# Patient Record
Sex: FEMALE | Race: WHITE | Employment: FULL TIME | URBAN - METROPOLITAN AREA
[De-identification: names, ages, dates, MRNs, and addresses within clinical notes are randomized per-mention and may not be internally consistent; named-entity substitution may affect disease eponyms.]

---

## 2017-03-17 ENCOUNTER — HOSPITAL ENCOUNTER (OUTPATIENT)
Dept: MAMMOGRAPHY | Age: 57
Discharge: HOME | End: 2017-03-17
Attending: FAMILY MEDICINE
Payer: COMMERCIAL

## 2017-03-17 DIAGNOSIS — Z12.39 SCREENING FOR BREAST CANCER: ICD-10-CM

## 2017-03-17 PROCEDURE — 77067 SCR MAMMO BI INCL CAD: CPT

## 2018-03-19 ENCOUNTER — HOSPITAL ENCOUNTER (OUTPATIENT)
Dept: VASCULAR SURGERY | Age: 58
Discharge: HOME | End: 2018-03-19
Attending: FAMILY MEDICINE
Payer: COMMERCIAL

## 2018-03-19 DIAGNOSIS — I73.9 PERIPHERAL VASCULAR DISEASE (HCC): ICD-10-CM

## 2018-03-19 PROCEDURE — 93880 EXTRACRANIAL BILAT STUDY: CPT

## 2018-03-19 NOTE — PROCEDURES
[facility]  *** FINAL REPORT ***    Name: Mart Rider  MRN: [id number]    Outpatient  : 20 Oct 1960  HIS Order #: [id number]  [address] [facility] Visit #: [id number]  Date: 19 Mar 2018    TYPE OF TEST: Cerebrovascular Duplex    REASON FOR TEST  PVD    Right Carotid:-             Proximal               Mid                 Distal  cm/s  Systolic  Diastolic  Systolic  Diastolic  Systolic  Diastolic  CCA:     98.8      25.0                            57.0      20.0  Bulb:  ECA:    132.0      19.0  ICA:     69.0      21.0                            57.0      22.0  ICA/CCA:  1.2       1.1    ICA Stenosis:    Right Vertebral:-  Finding: Antegrade  Sys:       50.0  Ale:       15.0    Right Subclavian:    Left Carotid:-            Proximal                Mid                 Distal  cm/s  Systolic  Diastolic  Systolic  Diastolic  Systolic  Diastolic  CCA:    711.3      32.0                            80.0      25.0  Bulb:  ECA:    127.0      20.0  ICA:     68.0      29.0                            77.0      38.0  ICA/CCA:  0.9       1.2    ICA Stenosis:    Left Vertebral:-  Finding: Antegrade  Sys:       48.0  Ale:       14.0    Left Subclavian:    INTERPRETATION/FINDINGS  PROCEDURE:  Color duplex ultrasound imaging of extracranial  cerebrovascular arteries. FINDINGS:       Right:  Internal carotid velocity is within normal limits. There   is narrowing of the internal carotid flow channel on color Doppler  imaging and  non-calcific  plaque on B-mode imaging, consistent with  less than 50 percent stenosis (lower portion of the 0 to 49 percent  range). The common and external carotid arteries are patent and  without evidence of hemodynamically significant stenosis. Left:  Internal carotid velocity is within normal limits.   There  is narrowing of the internal carotid flow channel on color Doppler  imaging and  non-calcific  plaque on B-mode imaging, consistent with  less than 50 percent stenosis (lower portion of the 0 to 49 percent  range). The common and external carotid arteries are patent and  without evidence of hemodynamically significant stenosis. IMPRESSION:  Consistent with less than  50% stenosis of the right  internal carotid and less than 50% stenosis of the left internal  carotid. Vertebrals are patent with antegrade flow. ADDITIONAL COMMENTS    I have personally reviewed the data relevant to the interpretation of  this  study. TECHNOLOGIST: Emiliano Tubbs  Signed: 03/19/2018 11:32 AM    PHYSICIAN: Johanne Horn MD  Signed: 03/20/2018 12:51 PM

## 2018-12-28 ENCOUNTER — HOSPITAL ENCOUNTER (OUTPATIENT)
Dept: MAMMOGRAPHY | Age: 58
Discharge: HOME | End: 2018-12-28
Attending: FAMILY MEDICINE
Payer: COMMERCIAL

## 2018-12-28 ENCOUNTER — HOSPITAL ENCOUNTER (OUTPATIENT)
Dept: CT IMAGING | Age: 58
Discharge: HOME | End: 2018-12-28
Attending: FAMILY MEDICINE
Payer: COMMERCIAL

## 2018-12-28 DIAGNOSIS — Z12.2 ENCOUNTER FOR SCREENING FOR MALIGNANT NEOPLASM OF RESPIRATORY ORGANS: ICD-10-CM

## 2018-12-28 DIAGNOSIS — Z12.39 ENCOUNTER FOR BREAST CANCER SCREENING OTHER THAN MAMMOGRAM: ICD-10-CM

## 2018-12-28 DIAGNOSIS — Z87.891 PERSONAL HISTORY OF NICOTINE DEPENDENCE: ICD-10-CM

## 2018-12-28 PROCEDURE — 77067 SCR MAMMO BI INCL CAD: CPT

## 2018-12-28 PROCEDURE — G0297 LDCT FOR LUNG CA SCREEN: HCPCS

## 2019-01-11 ENCOUNTER — HOSPITAL ENCOUNTER (OUTPATIENT)
Dept: MAMMOGRAPHY | Age: 59
Discharge: HOME | End: 2019-01-11
Attending: FAMILY MEDICINE
Payer: COMMERCIAL

## 2019-01-11 DIAGNOSIS — R92.8 ABNORMAL MAMMOGRAM: ICD-10-CM

## 2019-01-11 PROCEDURE — 76642 ULTRASOUND BREAST LIMITED: CPT

## 2019-01-11 PROCEDURE — 77061 BREAST TOMOSYNTHESIS UNI: CPT

## 2019-01-18 ENCOUNTER — HOSPITAL ENCOUNTER (OUTPATIENT)
Dept: MAMMOGRAPHY | Age: 59
Discharge: HOME | End: 2019-01-18
Attending: FAMILY MEDICINE
Payer: COMMERCIAL

## 2019-01-18 DIAGNOSIS — R92.8 ABNORMAL MAMMOGRAM: ICD-10-CM

## 2019-01-18 DIAGNOSIS — N63.0 LUMP OR MASS IN BREAST: ICD-10-CM

## 2019-01-18 PROCEDURE — 74011000250 HC RX REV CODE- 250: Performed by: RADIOLOGY

## 2019-01-18 PROCEDURE — 77065 DX MAMMO INCL CAD UNI: CPT

## 2019-01-18 PROCEDURE — 88342 IMHCHEM/IMCYTCHM 1ST ANTB: CPT

## 2019-01-18 PROCEDURE — 74011250636 HC RX REV CODE- 250/636: Performed by: RADIOLOGY

## 2019-01-18 PROCEDURE — 88341 IMHCHEM/IMCYTCHM EA ADD ANTB: CPT

## 2019-01-18 PROCEDURE — 19082 BX BREAST ADD LESION STRTCTC: CPT

## 2019-01-18 PROCEDURE — 19081 BX BREAST 1ST LESION STRTCTC: CPT

## 2019-01-18 PROCEDURE — 88305 TISSUE EXAM BY PATHOLOGIST: CPT

## 2019-01-18 PROCEDURE — 88360 TUMOR IMMUNOHISTOCHEM/MANUAL: CPT

## 2019-01-18 RX ORDER — LIDOCAINE HYDROCHLORIDE 10 MG/ML
15 INJECTION INFILTRATION; PERINEURAL
Status: COMPLETED | OUTPATIENT
Start: 2019-01-18 | End: 2019-01-18

## 2019-01-18 RX ORDER — LIDOCAINE HYDROCHLORIDE AND EPINEPHRINE 10; 10 MG/ML; UG/ML
10 INJECTION, SOLUTION INFILTRATION; PERINEURAL ONCE
Status: COMPLETED | OUTPATIENT
Start: 2019-01-18 | End: 2019-01-18

## 2019-01-18 RX ADMIN — LIDOCAINE HYDROCHLORIDE 15 ML: 10 INJECTION, SOLUTION INFILTRATION; PERINEURAL at 10:00

## 2019-01-18 RX ADMIN — LIDOCAINE HYDROCHLORIDE,EPINEPHRINE BITARTRATE 100 MG: 10; .01 INJECTION, SOLUTION INFILTRATION; PERINEURAL at 10:00

## 2019-01-18 NOTE — DISCHARGE INSTRUCTIONS
Breast Biopsy Discharge Instructions    PAIN CONTROL     You may have mild discomfort; take 1-2 Tylenol every 4-6 hours as needed.  Do not take aspirin containing products or anti-inflammatory medications (Advil, Aleve, Motrin, Ibuprofen, etc.) for 24 hours.  Wearing a comfortable bra for support may help with discomfort. WOUND CARE      A small amount of bleeding or bruising at the biopsy site is normal. Watch for signs and symptoms of infections: skin warm to touch, yellowish drainage from wound, fever or severe pain.  Place an ice pack over the site hourly, 20-30 at a time for a few hours today.  The clear dressing is water resistant (you may shower, but do not allow the dressing to become saturated). You may remove the dressing in 48 hours. The steri-strips (small pieces of tape covering the incision) will fall off on their own in a few days. If the clear dressing irritates your skin or begins to peel off, you may remove it. Remember, if you remove the clear dressing before 48 hours, you should not get the site wet.  If at any point you have EXCESSIVE BLEEDING (saturating the gauze under the clear dressing) OR pain please call:    Daytime 7:30am-5:00pm: [facility] [phone]    After Hours:    [phone] (ask to speak to a radiologist)              or [address] [place] [address] [phone]    ACTIVITY     Do not participate in any strenuous activities for 24 hours (exercise, sports, housework, etc.).  You may resume work (light duty only) for the first 24 hours.  No heavy lifting with the arm on the affected side of your body. ADDITIONAL INSTRUCTIONS    We will call you with results on Tuesday January 22 in the afternoon.       [address]    MD: Dinora  [facility]  Radiology Tech: Isael Skinner

## 2019-01-18 NOTE — PROGRESS NOTES
Patient tolerated right breast biopsy x 2 well with minimal bleeding. Biopsy site was bandaged in the usual fashion and discharge instructions were reviewed with the patient. She was provided with a written copy as well. Advised patient that results should be available by Tuesday and that she will receive a phone call in the afternoon. Encouraged her to call with any questions or concerns.

## 2019-01-22 NOTE — PROGRESS NOTES
Pathology results conveyed to patient by Dr. Karlene Daniels via phone call. Called patient with appointment information, Dr. Wenceslao Abernathy Friday 7/85 at 1 pm, 12:30 arrival time. Patient provided with office contact information. She states that the biopsy sites are healing well and she has no concerns. Encouraged her to call with any questions.

## 2019-01-25 ENCOUNTER — OFFICE VISIT (OUTPATIENT)
Dept: SURGERY | Age: 59
End: 2019-01-25

## 2019-01-25 VITALS — BODY MASS INDEX: 33.23 KG/M2 | WEIGHT: 176 LBS | HEIGHT: 61 IN

## 2019-01-25 DIAGNOSIS — C50.911 INVASIVE DUCTAL CARCINOMA OF BREAST, FEMALE, RIGHT (HCC): Primary | ICD-10-CM

## 2019-01-25 DIAGNOSIS — N63.10 BREAST MASS, RIGHT: ICD-10-CM

## 2019-01-25 RX ORDER — DEXTROAMPHETAMINE SACCHARATE, AMPHETAMINE ASPARTATE, DEXTROAMPHETAMINE SULFATE AND AMPHETAMINE SULFATE 5; 5; 5; 5 MG/1; MG/1; MG/1; MG/1
20 TABLET ORAL 2 TIMES DAILY
COMMUNITY
End: 2021-02-08

## 2019-01-25 RX ORDER — AMLODIPINE BESYLATE 5 MG/1
TABLET ORAL
Refills: 6 | COMMUNITY
Start: 2019-01-19 | End: 2021-10-08

## 2019-01-25 RX ORDER — ASPIRIN 81 MG/1
81 TABLET ORAL DAILY
COMMUNITY
End: 2021-11-12

## 2019-01-25 RX ORDER — LISINOPRIL AND HYDROCHLOROTHIAZIDE 12.5; 2 MG/1; MG/1
TABLET ORAL
Refills: 3 | COMMUNITY
Start: 2019-01-19 | End: 2021-10-08

## 2019-01-25 RX ORDER — ATORVASTATIN CALCIUM 20 MG/1
TABLET, FILM COATED ORAL
Refills: 2 | COMMUNITY
Start: 2019-01-19

## 2019-01-25 NOTE — PROGRESS NOTES
HISTORY OF PRESENT ILLNESS [facility] is a 62 y.o. female. HPI    NEW patient presents for consultation at the request of Dr. Destiny Terrell for new diagnosis of RIGHT breast cancer diagnosed after an abnormal screening mammogram.  She is not feeling any breast lumps, has no breast pain, no nipple discharge/retractin or skin change. She has had multiple biopsies in the past, all benign. 01/18/19: RIGHT breast biopsies. PATH as follows: - Site A, mass: IDC with tubular features, 0.4 cm in greatest dimension in a single core , grade 1, ER+(100%)/[place]+(95%)/HER2-. 
 - Site B, calcifications: Fibrocystic changes with usual ductal hyperplasia, columnar cell hyperplasia, apocrine cysts and calcifications, negative for in situ or invasive carcinoma. There is no FH of breast or ovarian cancer. BILATERAL screening mammogram 12/28/18, BIRADS 0, incomplete. RIGHT diagnostic mammogram and RIGHT breast ultrsaound 1/11/19, suspicious. Review of Systems Constitutional: Negative. HENT: Negative. Eyes: Negative. Respiratory: Negative. Cardiovascular: Negative. Gastrointestinal: Positive for heartburn. Genitourinary: Negative. Musculoskeletal: Negative. Skin: Negative. Neurological: Negative. Endo/Heme/Allergies: Negative. Psychiatric/Behavioral: Positive for depression. The patient is nervous/anxious. Physical Exam 
 
ASSESSMENT and PLAN 
{ASSESSMENT/PLAN:[id number]}

## 2019-01-25 NOTE — PROGRESS NOTES
HISTORY OF PRESENT ILLNESS  Espiridion Severe is a 62 y.o. female. HPI  NEW patient presents for consultation at the request of Dr. Turpin Stands for new diagnosis of RIGHT breast cancer diagnosed after an abnormal screening mammogram.  She is not feeling any breast lumps, has no breast pain, no nipple discharge/retractin or skin change. She has had multiple biopsies in the past, all benign. 01/18/19: RIGHT breast biopsies. PATH as follows:              - Site A, mass: IDC with tubular features, 0.4 cm in greatest dimension in a single core , grade 1, ER+(100%)/[place]+(95%)/HER2-.              - Site B, calcifications: Fibrocystic changes with usual ductal hyperplasia, columnar cell hyperplasia, apocrine cysts and calcifications, negative for in situ or invasive carcinoma. There is no FH of breast or ovarian cancer. BILATERAL screening mammogram 12/28/18, BIRADS 0, incomplete.     RIGHT diagnostic mammogram and RIGHT breast ultrsaound 1/11/19, suspicious.       Past Medical History:   Diagnosis Date    Hypertension     Mixed connective tissue disease ([facility] [address] 2017    Sees Dr. Jaquelin Diallo    Traumatic neuroma, left neck 9/14/2010       Past Surgical History:   Procedure Laterality Date    HX BREAST BIOPSY Bilateral     benign stereo biopsies years ago    HX CHOLECYSTECTOMY      HX TUBAL LIGATION      [place] REMOVAL OF ANAL FISSURE      PREP FACE/ORAL PROST PALATAL LIFT         Social History     Socioeconomic History    Marital status:      Spouse name: Not on file    Number of children: Not on file    Years of education: Not on file    Highest education level: Not on file   Social Needs    Financial resource strain: Not on file    Food insecurity - worry: Not on file    Food insecurity - inability: Not on file   [employer] needs - medical: Not on file   [employer] needs - non-medical: Not on file   Occupational History    Not on file   Tobacco Use    Smoking status: Current Every Day Smoker Packs/day: 1.00     Years: 20.00     Pack years: 20.00    Smokeless tobacco: Never Used   Substance and Sexual Activity    Alcohol use: Yes    Drug use: No    Sexual activity: Yes   Other Topics Concern    Not on file   Social History Narrative    Not on file       Current Outpatient Medications on File Prior to Visit   Medication Sig Dispense Refill    amLODIPine (NORVASC) 5 mg tablet TK 1 T PO QD  6    atorvastatin (LIPITOR) 20 mg tablet TK 1 T PO QD  2    lisinopril-hydroCHLOROthiazide (PRINZIDE, ZESTORETIC) 20-12.5 mg per tablet TK 1 T PO QD  3    aspirin delayed-release 81 mg tablet Take  by mouth daily.  dextroamphetamine-amphetamine (ADDERALL) 20 mg tablet Take 20 mg by mouth.  escitalopram (LEXAPRO) 10 mg tablet Take 20 mg by mouth daily.  omeprazole (PRILOSEC OTC) 20 mg tablet Take 20 mg by mouth daily. No current facility-administered medications on file prior to visit. No Known Allergies    OB History     No data available        ROS  Constitutional: Negative. HENT: Negative. Eyes: Negative. Respiratory: Negative. Cardiovascular: Negative. Gastrointestinal: Positive for heartburn. Genitourinary: Negative. Musculoskeletal: Negative. Skin: Negative. Neurological: Negative. Endo/Heme/Allergies: Negative. Psychiatric/Behavioral: Positive for depression. The patient is nervous/anxious. Physical Exam   Cardiovascular: Normal rate, regular rhythm and normal heart sounds. Pulmonary/Chest: Breath sounds normal. Right breast exhibits no inverted nipple, no mass, no nipple discharge, no skin change and no tenderness. Left breast exhibits no inverted nipple, no mass, no nipple discharge, no skin change and no tenderness. Breasts are symmetrical.       Lymphadenopathy:        Right cervical: No superficial cervical, no deep cervical and no posterior cervical adenopathy present.        Left cervical: No superficial cervical, no deep cervical and no posterior cervical adenopathy present. She has no axillary adenopathy. Right axillary: No pectoral and no lateral adenopathy present. Left axillary: No pectoral and no lateral adenopathy present. BREAST ULTRASOUND, Preop planning  Indication:preop planning  right Breast 7-8 o'clock   Technique: The area was scanned using a high-frequency linear-array near-field transducer  Findings: Mass and bx clip  Impression: Breast cancer  Disposition:  Surgery    ASSESSMENT and PLAN    ICD-10-CM ICD-9-CM    1. Invasive ductal carcinoma of breast, female, right ([facility] [address] C50.911 174.9    2. Breast mass, right N63.10 611.72       Pt presents for consultation re: new diagnosis of RIGHT breast IDC, ER+/AR+/HER2-, clinical stage 1. Dense breasts on exam. US visualizes mass and bx clip at RIGHT breast 7-8:00. We had a long discussion of options for treatment. A total of 85 minutes were spent face-to-face with the patient during this encounter, accompanied by her  and daughter, and over half of that time was spent on counseling and coordination of care. We discussed in depth the pathology results, and the need for surgery following MRI for extent of disease and surgical planning. The goals of treatment are to treat the breast, and to reduce risk of recurrence of this cancer in the breast or elsewhere in the body. Discussed treatment options with risks, complications, benefits, and limitations, including lumpectomy with XRT, and mastectomy, both having the same cure rate. Also discussed the placement of BioZorb during a lumpectomy, which functions as both a target for any adjuvant XRT, and as a \"scaffolding\" for breast tissue. The Marzette Beer will dissolve in approximately 1.5 years, and leave markers behind. Also discussed benefit and technique of SLNBx of 3-4 LNs during initial surgery.     We also covered risk reduction strategies as well as post-surgical therapies including XRT, chemotherapy, and hormonal therapy with estrogen blocker. If chemo is not needed, XRT will start 3-4 weeks after surgery. If chemo is necessary, XRT will follow chemo. XRT treatments after a lumpectomy will be 5 days/week, and will last for 4-6 weeks, depending on LN involvement. Pt would not likely need XRT after a mastectomy, except in the case of LN involvement. Will send for MammaPrint to help determine whether pt will benefit from chemotherapy. This will also be determined in part by final tumor size and LN involvement. Pt will not likely need chemo for small, clinically low risk tumor, but if MammaPrint indicates high risk tumor, it will be important for her to stay on estrogen blocker. Discussed that estrogen blocking pill will further reduce the risk of recurrence, as pts cancer is ER+. Side effects of estrogen blocker may include hot flashes. Side effects of XRT may include fatigue, local skin changes, and joint pain. We also discussed the pts questions and concerns. Lumpectomy is an outpatient procedure, with stitches below the skin, and waterproof glue over the incision. Will plan for circumareolar incision at LOQ of areola. Pt may be able to manage post-op pain with Tylenol. Expected 1-2 week recovery period after lumpectomy. Pt notes that she works from home - she can return working as soon as she feels well. Pt has elected to proceed with lumpectomy. Will order MRI and plan further from there - radiology will call pt to schedule. This plan was reviewed with the patient and patient agrees. All questions were answered.     Written by Massiel Conti, as dictated by Dr. Rosa Pavon MD.

## 2019-01-25 NOTE — LETTER
1/28/2019 9:40 AM 
 
Patient: Markel Shrestha YOB: 1960 Date of Visit: 1/25/2019 Dear Dr. Natalio Thornton: Thank you for referring Ms. Markel Shrestha to me for evaluation/treatment. Below are the relevant portions of my assessment and plan of care. HISTORY OF PRESENT ILLNESS Markel Shrestha is a 62 y.o. female. HPI 
NEW patient presents for consultation at the request of Dr. Temitope Schmidt for new diagnosis of RIGHT breast cancer diagnosed after an abnormal screening mammogram.  She is not feeling any breast lumps, has no breast pain, no nipple discharge/retractin or skin change. She has had multiple biopsies in the past, all benign. 01/18/19: RIGHT breast biopsies. PATH as follows: - Site A, mass: IDC with tubular features, 0.4 cm in greatest dimension in a single core , grade 1, ER+(100%)/[place]+(95%)/HER2-. 
            - Site B, calcifications: Fibrocystic changes with usual ductal hyperplasia, columnar cell hyperplasia, apocrine cysts and calcifications, negative for in situ or invasive carcinoma. There is no FH of breast or ovarian cancer. BILATERAL screening mammogram 12/28/18, BIRADS 0, incomplete. RIGHT diagnostic mammogram and RIGHT breast ultrsaound 1/11/19, suspicious.   
  
Past Medical History:  
Diagnosis Date  Hypertension  Mixed connective tissue disease ([facility] [address] 2017 Sees Dr. Kaylyn Smith  Traumatic neuroma, left neck 9/14/2010 Past Surgical History:  
Procedure Laterality Date  HX BREAST BIOPSY Bilateral   
 benign stereo biopsies years ago  HX CHOLECYSTECTOMY  HX TUBAL LIGATION    
 [place]AL OF [place]SURE    
 PREP FACE/ORAL PROST PALATAL LIFT Social History Socioeconomic History  Marital status:  Spouse name: Not on file  Number of children: Not on file  Years of education: Not on file  Highest education level: Not on file Social Needs  Financial resource strain: Not on file  Food insecurity - worry: Not on file  Food insecurity - inability: Not on file  Transportation needs - medical: Not on file  Transportation needs - non-medical: Not on file Occupational History  Not on file Tobacco Use  Smoking status: Current Every Day Smoker Packs/day: 1.00 Years: 20.00 Pack years: 20.00  Smokeless tobacco: Never Used Substance and Sexual Activity  Alcohol use: Yes  Drug use: No  
 Sexual activity: Yes Other Topics Concern  Not on file Social History Narrative  Not on file Current Outpatient Medications on File Prior to Visit Medication Sig Dispense Refill  amLODIPine (NORVASC) 5 mg tablet TK 1 T PO QD  6  
 atorvastatin (LIPITOR) 20 mg tablet TK 1 T PO QD  2  
 lisinopril-hydroCHLOROthiazide (PRINZIDE, ZESTORETIC) 20-12.5 mg per tablet TK 1 T PO QD  3  
 aspirin delayed-release 81 mg tablet Take  by mouth daily.  dextroamphetamine-amphetamine (ADDERALL) 20 mg tablet Take 20 mg by mouth.  escitalopram (LEXAPRO) 10 mg tablet Take 20 mg by mouth daily.  omeprazole (PRILOSEC OTC) 20 mg tablet Take 20 mg by mouth daily. No current facility-administered medications on file prior to visit. No Known Allergies OB History No data available ROS Constitutional: Negative. HENT: Negative. Eyes: Negative. Respiratory: Negative. Cardiovascular: Negative. Gastrointestinal: Positive for heartburn. Genitourinary: Negative. Musculoskeletal: Negative. Skin: Negative. Neurological: Negative. Endo/Heme/Allergies: Negative. Psychiatric/Behavioral: Positive for depression. The patient is nervous/anxious. Physical Exam  
Cardiovascular: Normal rate, regular rhythm and normal heart sounds. Pulmonary/Chest: Breath sounds normal. Right breast exhibits no inverted nipple, no mass, no nipple discharge, no skin change and no tenderness. Left breast exhibits no inverted nipple, no mass, no nipple discharge, no skin change and no tenderness. Breasts are symmetrical.  
 
 
Lymphadenopathy:  
     Right cervical: No superficial cervical, no deep cervical and no posterior cervical adenopathy present. Left cervical: No superficial cervical, no deep cervical and no posterior cervical adenopathy present. She has no axillary adenopathy. Right axillary: No pectoral and no lateral adenopathy present. Left axillary: No pectoral and no lateral adenopathy present. BREAST ULTRASOUND, Preop planning Indication:preop planning  right Breast 7-8 o'clock Technique: The area was scanned using a high-frequency linear-array near-field transducer Findings: Mass and bx clip Impression: Breast cancer Disposition:  Surgery ASSESSMENT and PLAN 
  ICD-10-CM ICD-9-CM 1. Invasive ductal carcinoma of breast, female, right ([facility] [address] C50.911 174.9 2. Breast mass, right N63.10 611.72 Pt presents for consultation re: new diagnosis of RIGHT breast IDC, ER+/[place]+/HER2-, clinical stage 1. Dense breasts on exam. US visualizes mass and bx clip at RIGHT breast 7-8:00. We had a long discussion of options for treatment. A total of 85 minutes were spent face-to-face with the patient during this encounter, accompanied by her  and daughter, and over half of that time was spent on counseling and coordination of care. We discussed in depth the pathology results, and the need for surgery following MRI for extent of disease and surgical planning. The goals of treatment are to treat the breast, and to reduce risk of recurrence of this cancer in the breast or elsewhere in the body. Discussed treatment options with risks, complications, benefits, and limitations, including lumpectomy with XRT, and mastectomy, both having the same cure rate.  Also discussed the placement of BioZorb during a lumpectomy, which functions as both a target for any adjuvant XRT, and as a \"scaffolding\" for breast tissue. The Radene Presley will dissolve in approximately 1.5 years, and leave markers behind. Also discussed benefit and technique of SLNBx of 3-4 LNs during initial surgery. We also covered risk reduction strategies as well as post-surgical therapies including XRT, chemotherapy, and hormonal therapy with estrogen blocker. If chemo is not needed, XRT will start 3-4 weeks after surgery. If chemo is necessary, XRT will follow chemo. XRT treatments after a lumpectomy will be 5 days/week, and will last for 4-6 weeks, depending on LN involvement. Pt would not likely need XRT after a mastectomy, except in the case of LN involvement. Will send for MammaPrint to help determine whether pt will benefit from chemotherapy. This will also be determined in part by final tumor size and LN involvement. Pt will not likely need chemo for small, clinically low risk tumor, but if MammaPrint indicates high risk tumor, it will be important for her to stay on estrogen blocker. Discussed that estrogen blocking pill will further reduce the risk of recurrence, as pts cancer is ER+. Side effects of estrogen blocker may include hot flashes. Side effects of XRT may include fatigue, local skin changes, and joint pain. We also discussed the pts questions and concerns. Lumpectomy is an outpatient procedure, with stitches below the skin, and waterproof glue over the incision. Will plan for circumareolar incision at LOQ of areola. Pt may be able to manage post-op pain with Tylenol. Expected 1-2 week recovery period after lumpectomy. Pt notes that she works from home - she can return working as soon as she feels well. Pt has elected to proceed with lumpectomy. Will order MRI and plan further from there - radiology will call pt to schedule. This plan was reviewed with the patient and patient agrees. All questions were answered. Written by Hero Jamil, as dictated by Dr. Shira Anne MD. 
 
 
If you have questions, please do not hesitate to call me. I look forward to following Ms. Sid Durant along with you.  
 
 
 
Sincerely, 
 
 
Bjorn Carrion MD

## 2019-01-28 NOTE — COMMUNICATION BODY
HISTORY OF PRESENT ILLNESS  Reyes Fontana is a 62 y.o. female. HPI  NEW patient presents for consultation at the request of Dr. Flynn Castillo for new diagnosis of RIGHT breast cancer diagnosed after an abnormal screening mammogram.  She is not feeling any breast lumps, has no breast pain, no nipple discharge/retractin or skin change. She has had multiple biopsies in the past, all benign. 01/18/19: RIGHT breast biopsies. PATH as follows:              - Site A, mass: IDC with tubular features, 0.4 cm in greatest dimension in a single core , grade 1, ER+(100%)/AL+(95%)/HER2-.              - Site B, calcifications: Fibrocystic changes with usual ductal hyperplasia, columnar cell hyperplasia, apocrine cysts and calcifications, negative for in situ or invasive carcinoma. There is no FH of breast or ovarian cancer. BILATERAL screening mammogram 12/28/18, BIRADS 0, incomplete.     RIGHT diagnostic mammogram and RIGHT breast ultrsaound 1/11/19, suspicious.       Past Medical History:   Diagnosis Date    Hypertension     Mixed connective tissue disease ([facility] [address] 2017    Sees Dr. Juan Francisco Dillard    Traumatic neuroma, left neck 9/14/2010       Past Surgical History:   Procedure Laterality Date    HX BREAST BIOPSY Bilateral     benign stereo biopsies years ago    HX CHOLECYSTECTOMY      HX TUBAL LIGATION      AL REMOVAL OF ANAL FISSURE      PREP FACE/ORAL PROST PALATAL LIFT         Social History     Socioeconomic History    Marital status:      Spouse name: Not on file    Number of children: Not on file    Years of education: Not on file    Highest education level: Not on file   Social Needs    Financial resource strain: Not on file    Food insecurity - worry: Not on file    Food insecurity - inability: Not on file   [employer] needs - medical: Not on file   [employer] needs - non-medical: Not on file   Occupational History    Not on file   Tobacco Use    Smoking status: Current Every Day Smoker Packs/day: 1.00     Years: 20.00     Pack years: 20.00    Smokeless tobacco: Never Used   Substance and Sexual Activity    Alcohol use: Yes    Drug use: No    Sexual activity: Yes   Other Topics Concern    Not on file   Social History Narrative    Not on file       Current Outpatient Medications on File Prior to Visit   Medication Sig Dispense Refill    amLODIPine (NORVASC) 5 mg tablet TK 1 T PO QD  6    atorvastatin (LIPITOR) 20 mg tablet TK 1 T PO QD  2    lisinopril-hydroCHLOROthiazide (PRINZIDE, ZESTORETIC) 20-12.5 mg per tablet TK 1 T PO QD  3    aspirin delayed-release 81 mg tablet Take  by mouth daily.  dextroamphetamine-amphetamine (ADDERALL) 20 mg tablet Take 20 mg by mouth.  escitalopram (LEXAPRO) 10 mg tablet Take 20 mg by mouth daily.  omeprazole (PRILOSEC OTC) 20 mg tablet Take 20 mg by mouth daily. No current facility-administered medications on file prior to visit. No Known Allergies    OB History     No data available        ROS  Constitutional: Negative. HENT: Negative. Eyes: Negative. Respiratory: Negative. Cardiovascular: Negative. Gastrointestinal: Positive for heartburn. Genitourinary: Negative. Musculoskeletal: Negative. Skin: Negative. Neurological: Negative. Endo/Heme/Allergies: Negative. Psychiatric/Behavioral: Positive for depression. The patient is nervous/anxious. Physical Exam   Cardiovascular: Normal rate, regular rhythm and normal heart sounds. Pulmonary/Chest: Breath sounds normal. Right breast exhibits no inverted nipple, no mass, no nipple discharge, no skin change and no tenderness. Left breast exhibits no inverted nipple, no mass, no nipple discharge, no skin change and no tenderness. Breasts are symmetrical.       Lymphadenopathy:        Right cervical: No superficial cervical, no deep cervical and no posterior cervical adenopathy present.        Left cervical: No superficial cervical, no deep cervical and no posterior cervical adenopathy present. She has no axillary adenopathy. Right axillary: No pectoral and no lateral adenopathy present. Left axillary: No pectoral and no lateral adenopathy present. BREAST ULTRASOUND, Preop planning  Indication:preop planning  right Breast 7-8 o'clock   Technique: The area was scanned using a high-frequency linear-array near-field transducer  Findings: Mass and bx clip  Impression: Breast cancer  Disposition:  Surgery    ASSESSMENT and PLAN    ICD-10-CM ICD-9-CM    1. Invasive ductal carcinoma of breast, female, right ([facility] [address] C50.911 174.9    2. Breast mass, right N63.10 611.72       Pt presents for consultation re: new diagnosis of RIGHT breast IDC, ER+/MT+/HER2-, clinical stage 1. Dense breasts on exam. US visualizes mass and bx clip at RIGHT breast 7-8:00. We had a long discussion of options for treatment. A total of 85 minutes were spent face-to-face with the patient during this encounter, accompanied by her  and daughter, and over half of that time was spent on counseling and coordination of care. We discussed in depth the pathology results, and the need for surgery following MRI for extent of disease and surgical planning. The goals of treatment are to treat the breast, and to reduce risk of recurrence of this cancer in the breast or elsewhere in the body. Discussed treatment options with risks, complications, benefits, and limitations, including lumpectomy with XRT, and mastectomy, both having the same cure rate. Also discussed the placement of BioZorb during a lumpectomy, which functions as both a target for any adjuvant XRT, and as a \"scaffolding\" for breast tissue. The [place] will dissolve in approximately 1.5 years, and leave markers behind. Also discussed benefit and technique of SLNBx of 3-4 LNs during initial surgery.     We also covered risk reduction strategies as well as post-surgical therapies including XRT, chemotherapy, and hormonal therapy with estrogen blocker. If chemo is not needed, XRT will start 3-4 weeks after surgery. If chemo is necessary, XRT will follow chemo. XRT treatments after a lumpectomy will be 5 days/week, and will last for 4-6 weeks, depending on LN involvement. Pt would not likely need XRT after a mastectomy, except in the case of LN involvement. Will send for MammaPrint to help determine whether pt will benefit from chemotherapy. This will also be determined in part by final tumor size and LN involvement. Pt will not likely need chemo for small, clinically low risk tumor, but if MammaPrint indicates high risk tumor, it will be important for her to stay on estrogen blocker. Discussed that estrogen blocking pill will further reduce the risk of recurrence, as pts cancer is ER+. Side effects of estrogen blocker may include hot flashes. Side effects of XRT may include fatigue, local skin changes, and joint pain. We also discussed the pts questions and concerns. Lumpectomy is an outpatient procedure, with stitches below the skin, and waterproof glue over the incision. Will plan for circumareolar incision at LOQ of areola. Pt may be able to manage post-op pain with Tylenol. Expected 1-2 week recovery period after lumpectomy. Pt notes that she works from home - she can return working as soon as she feels well. Pt has elected to proceed with lumpectomy. Will order MRI and plan further from there - radiology will call pt to schedule. This plan was reviewed with the patient and patient agrees. All questions were answered.     Written by Sonam Riggs, as dictated by Dr. Gosia Vila MD.

## 2019-01-29 ENCOUNTER — DOCUMENTATION ONLY (OUTPATIENT)
Dept: SURGERY | Age: 59
End: 2019-01-29

## 2019-01-30 NOTE — PROGRESS NOTES
Faxed TRF to [facility] to order MammaPrint per order of Dr. Lilliam Johnson, confirmation received. Insurance letter mailed to patient.

## 2019-02-01 ENCOUNTER — HOSPITAL ENCOUNTER (OUTPATIENT)
Dept: MRI IMAGING | Age: 59
Discharge: HOME | End: 2019-02-01
Attending: SURGERY
Payer: COMMERCIAL

## 2019-02-01 ENCOUNTER — DOCUMENTATION ONLY (OUTPATIENT)
Dept: SURGERY | Age: 59
End: 2019-02-01

## 2019-02-01 DIAGNOSIS — C50.911 INVASIVE DUCTAL CARCINOMA OF BREAST, FEMALE, RIGHT (HCC): ICD-10-CM

## 2019-02-01 LAB — CREAT BLD-MCNC: 1 MG/DL (ref 0.6–1.3)

## 2019-02-01 PROCEDURE — A9585 GADOBUTROL INJECTION: HCPCS | Performed by: SURGERY

## 2019-02-01 PROCEDURE — 77049 MRI BREAST C-+ W/CAD BI: CPT

## 2019-02-01 PROCEDURE — C8937 CAD BREAST MRI: HCPCS

## 2019-02-01 PROCEDURE — 74011250636 HC RX REV CODE- 250/636: Performed by: SURGERY

## 2019-02-01 PROCEDURE — 82565 ASSAY OF CREATININE: CPT

## 2019-02-01 RX ADMIN — GADOBUTROL 8 ML: 604.72 INJECTION INTRAVENOUS at 08:47

## 2019-02-05 ENCOUNTER — TELEPHONE (OUTPATIENT)
Dept: SURGERY | Age: 59
End: 2019-02-05

## 2019-02-06 ENCOUNTER — DOCUMENTATION ONLY (OUTPATIENT)
Dept: SURGERY | Age: 59
End: 2019-02-06

## 2019-02-06 NOTE — PROGRESS NOTES
MammaPrint returned \"unable to provide result for this specimen. \"  Will let Dr. Tucker Saravia know.

## 2019-02-07 DIAGNOSIS — C50.911 MALIGNANT NEOPLASM OF RIGHT FEMALE BREAST, UNSPECIFIED ESTROGEN RECEPTOR STATUS, UNSPECIFIED SITE OF BREAST (HCC): Primary | ICD-10-CM

## 2019-02-18 ENCOUNTER — HOSPITAL ENCOUNTER (OUTPATIENT)
Dept: PREADMISSION TESTING | Age: 59
Discharge: HOME | End: 2019-02-18
Payer: COMMERCIAL

## 2019-02-18 ENCOUNTER — HOSPITAL ENCOUNTER (OUTPATIENT)
Dept: NON INVASIVE DIAGNOSTICS | Age: 59
Discharge: HOME | End: 2019-02-18
Attending: ANESTHESIOLOGY
Payer: COMMERCIAL

## 2019-02-18 VITALS
BODY MASS INDEX: 34.07 KG/M2 | RESPIRATION RATE: 18 BRPM | HEART RATE: 79 BPM | SYSTOLIC BLOOD PRESSURE: 120 MMHG | OXYGEN SATURATION: 97 % | HEIGHT: 61 IN | WEIGHT: 180.44 LBS | DIASTOLIC BLOOD PRESSURE: 59 MMHG | TEMPERATURE: 98 F

## 2019-02-18 LAB
ANION GAP SERPL CALC-SCNC: 6 MMOL/L (ref 5–15)
ATRIAL RATE: 66 BPM
BUN SERPL-MCNC: 26 MG/DL (ref 6–20)
BUN/CREAT SERPL: 27 (ref 12–20)
CALCIUM SERPL-MCNC: 9.5 MG/DL (ref 8.5–10.1)
CALCULATED P AXIS, ECG09: 59 DEGREES
CALCULATED R AXIS, ECG10: 66 DEGREES
CALCULATED T AXIS, ECG11: 82 DEGREES
CHLORIDE SERPL-SCNC: 102 MMOL/L (ref 97–108)
CO2 SERPL-SCNC: 31 MMOL/L (ref 21–32)
CREAT SERPL-MCNC: 0.95 MG/DL (ref 0.55–1.02)
DIAGNOSIS, 93000: NORMAL
GLUCOSE SERPL-MCNC: 89 MG/DL (ref 65–100)
P-R INTERVAL, ECG05: 152 MS
POTASSIUM SERPL-SCNC: 4.8 MMOL/L (ref 3.5–5.1)
Q-T INTERVAL, ECG07: 392 MS
QRS DURATION, ECG06: 76 MS
QTC CALCULATION (BEZET), ECG08: 410 MS
SODIUM SERPL-SCNC: 139 MMOL/L (ref 136–145)
VENTRICULAR RATE, ECG03: 66 BPM

## 2019-02-18 PROCEDURE — 36415 COLL VENOUS BLD VENIPUNCTURE: CPT

## 2019-02-18 PROCEDURE — 80048 BASIC METABOLIC PNL TOTAL CA: CPT

## 2019-02-18 PROCEDURE — 93005 ELECTROCARDIOGRAM TRACING: CPT

## 2019-02-18 NOTE — PERIOP NOTES
[address] [facility] [address], [address] [facility]                            MAIN OR                                  [phone]   MAIN PRE OP                          [phone]                                                                                AMBULATORY PRE OP          [phone]  PRE-ADMISSION TESTING    [phone]     Surgery Date:   Tuesday, 2/26/19. Is surgery arrival time given by surgeon? Yes. Please follow instructions by Dr. Lewis Lapping office. INSTRUCTIONS BEFORE YOUR SURGERY   When You  Arrive   Arrive at the [address] [facility] on the day of your surgery  Have your insurance card, photo ID, and any copayment (if needed)     Food   and   Drink   NO food or drink after midnight the night before surgery    This means NO water, gum, mints, coffee, juice, etc.  No alcohol (beer, wine, liquor) 24 hours before and after surgery     Medications to   TAKE   Morning of Surgery   MEDICATIONS TO TAKE THE MORNING OF SURGERY WITH A SIP OF WATER:    Amlodipine, Omeprazole and Lexapro with small sip of water. Medications  To  STOP      7 days before surgery    Non-Steroidal anti-inflammatory Drugs (NSAID's): for example, Ibuprofen (Advil, Motrin), Naproxen (Aleve)   Aspirin, if taking for pain    Herbal supplements, vitamins, and fish oil   Other:  (Pain medications not listed above, including Tylenol may be taken)   Blood  Thinners    If you take  Aspirin, Plavix, Coumadin, or any blood-thinning or anti-blood clot medicine, talk to the doctor who prescribed the medications for pre-operative instructions.      Bathing Clothing  Jewelry  Valuables       If you shower the morning of surgery, please do not apply anything to your skin (lotions, powders, deodorant, or makeup, especially mascara)   Follow all special bath instructions (for total joint replacement, spine and bowel surgeries)   Do not shave or trim anywhere 24 hours before surgery   Wear your hair loose or down; no pony-tails, buns, or metal hair clips   Wear loose, comfortable, clean clothes   Wear glasses instead of contacts   Leave money, valuables, and jewelry, including body piercings, at home     Going Home       or Spending the Night    SAME-DAY SURGERY: You must have a responsible adult drive you home and stay with you 24 hours after surgery   ADMITS: If your doctor is keeping you into the hospital after surgery, leave personal belongings/luggage in your car until you have a hospital room number. Hospital discharge time is 12 noon  Drivers must be here before 12 noon unless you are told differently   Special Instructions Free  parking with No Tipping. Bring completed medication form on day of surgery. Follow all instructions so your surgery wont be cancelled. Please, be on time. If a situation occurs and you are delayed the day of surgery, call [phone] or          [phone]. If your physical condition changes (like a fever, cold, flu, etc.) call your surgeon. The patient was contacted  in person / . Home medication reviewed and verified during PAT appointment. The patient verbalizes understanding of all instructions and does not  need reinforcement.

## 2019-02-20 ENCOUNTER — DOCUMENTATION ONLY (OUTPATIENT)
Dept: SURGERY | Age: 59
End: 2019-02-20

## 2019-02-25 ENCOUNTER — ANESTHESIA EVENT (OUTPATIENT)
Dept: SURGERY | Age: 59
End: 2019-02-25
Payer: COMMERCIAL

## 2019-02-26 ENCOUNTER — HOSPITAL ENCOUNTER (OUTPATIENT)
Dept: NUCLEAR MEDICINE | Age: 59
Discharge: HOME | End: 2019-02-26
Attending: SURGERY
Payer: COMMERCIAL

## 2019-02-26 ENCOUNTER — HOSPITAL ENCOUNTER (OUTPATIENT)
Age: 59
Setting detail: OUTPATIENT SURGERY
Discharge: HOME | End: 2019-02-26
Attending: SURGERY | Admitting: SURGERY
Payer: COMMERCIAL

## 2019-02-26 ENCOUNTER — ANESTHESIA (OUTPATIENT)
Dept: SURGERY | Age: 59
End: 2019-02-26
Payer: COMMERCIAL

## 2019-02-26 VITALS
DIASTOLIC BLOOD PRESSURE: 63 MMHG | HEART RATE: 71 BPM | BODY MASS INDEX: 33.05 KG/M2 | TEMPERATURE: 97.4 F | HEIGHT: 61 IN | OXYGEN SATURATION: 95 % | WEIGHT: 175.04 LBS | SYSTOLIC BLOOD PRESSURE: 111 MMHG | RESPIRATION RATE: 12 BRPM

## 2019-02-26 DIAGNOSIS — C50.911 MALIGNANT NEOPLASM OF RIGHT FEMALE BREAST, UNSPECIFIED ESTROGEN RECEPTOR STATUS, UNSPECIFIED SITE OF BREAST (HCC): ICD-10-CM

## 2019-02-26 DIAGNOSIS — C50.911 MALIGNANT NEOPLASM OF RIGHT BREAST (HCC): ICD-10-CM

## 2019-02-26 PROCEDURE — 77030011267 HC ELECTRD BLD COVD -A: Performed by: SURGERY

## 2019-02-26 PROCEDURE — 74011250636 HC RX REV CODE- 250/636

## 2019-02-26 PROCEDURE — 77030032490 HC SLV COMPR SCD KNE COVD -B: Performed by: SURGERY

## 2019-02-26 PROCEDURE — 77030034626 HC LIGASURE SM JAW SEAL OPN SURG COVD -E: Performed by: SURGERY

## 2019-02-26 PROCEDURE — 88342 IMHCHEM/IMCYTCHM 1ST ANTB: CPT

## 2019-02-26 PROCEDURE — 77030002996 HC SUT SLK J&J -A: Performed by: SURGERY

## 2019-02-26 PROCEDURE — 76030000003 HC AMB SURG OR TIME 1.5 TO 2: Performed by: SURGERY

## 2019-02-26 PROCEDURE — 77030002966 HC SUT PDS J&J -A: Performed by: SURGERY

## 2019-02-26 PROCEDURE — 77030018836 HC SOL IRR NACL ICUM -A: Performed by: SURGERY

## 2019-02-26 PROCEDURE — 76060000063 HC AMB SURG ANES 1.5 TO 2 HR: Performed by: SURGERY

## 2019-02-26 PROCEDURE — 76210000040 HC AMBSU PH I REC FIRST 0.5 HR: Performed by: SURGERY

## 2019-02-26 PROCEDURE — 74011250636 HC RX REV CODE- 250/636: Performed by: SURGERY

## 2019-02-26 PROCEDURE — A4648 IMPLANTABLE TISSUE MARKER: HCPCS | Performed by: SURGERY

## 2019-02-26 PROCEDURE — 77030020782 HC GWN BAIR PAWS FLX 3M -B

## 2019-02-26 PROCEDURE — 88341 IMHCHEM/IMCYTCHM EA ADD ANTB: CPT

## 2019-02-26 PROCEDURE — 74011250636 HC RX REV CODE- 250/636: Performed by: ANESTHESIOLOGY

## 2019-02-26 PROCEDURE — 77030031139 HC SUT VCRL2 J&J -A: Performed by: SURGERY

## 2019-02-26 PROCEDURE — 77030002933 HC SUT MCRYL J&J -A: Performed by: SURGERY

## 2019-02-26 PROCEDURE — 77030039266 HC ADH SKN EXOFIN S2SG -A: Performed by: SURGERY

## 2019-02-26 PROCEDURE — 74011000250 HC RX REV CODE- 250: Performed by: SURGERY

## 2019-02-26 PROCEDURE — 77030008684 HC TU ET CUF COVD -B: Performed by: ANESTHESIOLOGY

## 2019-02-26 PROCEDURE — 78195 LYMPH SYSTEM IMAGING: CPT

## 2019-02-26 PROCEDURE — 74011000250 HC RX REV CODE- 250

## 2019-02-26 PROCEDURE — 76210000057 HC AMBSU PH II REC 1 TO 1.5 HR: Performed by: SURGERY

## 2019-02-26 PROCEDURE — 88307 TISSUE EXAM BY PATHOLOGIST: CPT

## 2019-02-26 RX ORDER — LIDOCAINE HYDROCHLORIDE AND EPINEPHRINE 10; 10 MG/ML; UG/ML
30 INJECTION, SOLUTION INFILTRATION; PERINEURAL ONCE
Status: DISCONTINUED | OUTPATIENT
Start: 2019-02-26 | End: 2019-02-26 | Stop reason: HOSPADM

## 2019-02-26 RX ORDER — SODIUM CHLORIDE, SODIUM LACTATE, POTASSIUM CHLORIDE, CALCIUM CHLORIDE 600; 310; 30; 20 MG/100ML; MG/100ML; MG/100ML; MG/100ML
100 INJECTION, SOLUTION INTRAVENOUS CONTINUOUS
Status: DISCONTINUED | OUTPATIENT
Start: 2019-02-26 | End: 2019-02-26 | Stop reason: HOSPADM

## 2019-02-26 RX ORDER — ONDANSETRON 2 MG/ML
INJECTION INTRAMUSCULAR; INTRAVENOUS AS NEEDED
Status: DISCONTINUED | OUTPATIENT
Start: 2019-02-26 | End: 2019-02-26 | Stop reason: HOSPADM

## 2019-02-26 RX ORDER — LIDOCAINE HYDROCHLORIDE 10 MG/ML
0.1 INJECTION, SOLUTION EPIDURAL; INFILTRATION; INTRACAUDAL; PERINEURAL AS NEEDED
Status: DISCONTINUED | OUTPATIENT
Start: 2019-02-26 | End: 2019-02-26 | Stop reason: HOSPADM

## 2019-02-26 RX ORDER — CEFAZOLIN SODIUM/WATER 2 G/20 ML
2 SYRINGE (ML) INTRAVENOUS
Status: COMPLETED | OUTPATIENT
Start: 2019-02-26 | End: 2019-02-26

## 2019-02-26 RX ORDER — MIDAZOLAM HYDROCHLORIDE 1 MG/ML
INJECTION, SOLUTION INTRAMUSCULAR; INTRAVENOUS AS NEEDED
Status: DISCONTINUED | OUTPATIENT
Start: 2019-02-26 | End: 2019-02-26 | Stop reason: HOSPADM

## 2019-02-26 RX ORDER — HYDROMORPHONE HYDROCHLORIDE 2 MG/ML
.25-1 INJECTION, SOLUTION INTRAMUSCULAR; INTRAVENOUS; SUBCUTANEOUS
Status: DISCONTINUED | OUTPATIENT
Start: 2019-02-26 | End: 2019-02-26 | Stop reason: HOSPADM

## 2019-02-26 RX ORDER — LIDOCAINE HYDROCHLORIDE 20 MG/ML
INJECTION, SOLUTION EPIDURAL; INFILTRATION; INTRACAUDAL; PERINEURAL AS NEEDED
Status: DISCONTINUED | OUTPATIENT
Start: 2019-02-26 | End: 2019-02-26 | Stop reason: HOSPADM

## 2019-02-26 RX ORDER — DEXAMETHASONE SODIUM PHOSPHATE 4 MG/ML
INJECTION, SOLUTION INTRA-ARTICULAR; INTRALESIONAL; INTRAMUSCULAR; INTRAVENOUS; SOFT TISSUE AS NEEDED
Status: DISCONTINUED | OUTPATIENT
Start: 2019-02-26 | End: 2019-02-26 | Stop reason: HOSPADM

## 2019-02-26 RX ORDER — PROPOFOL 10 MG/ML
INJECTION, EMULSION INTRAVENOUS AS NEEDED
Status: DISCONTINUED | OUTPATIENT
Start: 2019-02-26 | End: 2019-02-26 | Stop reason: HOSPADM

## 2019-02-26 RX ORDER — EPHEDRINE SULFATE 50 MG/ML
INJECTION, SOLUTION INTRAVENOUS AS NEEDED
Status: DISCONTINUED | OUTPATIENT
Start: 2019-02-26 | End: 2019-02-26 | Stop reason: HOSPADM

## 2019-02-26 RX ORDER — HYDROCODONE BITARTRATE AND ACETAMINOPHEN 7.5; 325 MG/1; MG/1
1 TABLET ORAL
Qty: 25 TAB | Refills: 0 | Status: SHIPPED | OUTPATIENT
Start: 2019-02-26 | End: 2019-03-05

## 2019-02-26 RX ORDER — FENTANYL CITRATE 50 UG/ML
INJECTION, SOLUTION INTRAMUSCULAR; INTRAVENOUS AS NEEDED
Status: DISCONTINUED | OUTPATIENT
Start: 2019-02-26 | End: 2019-02-26 | Stop reason: HOSPADM

## 2019-02-26 RX ORDER — SUCCINYLCHOLINE CHLORIDE 20 MG/ML
INJECTION INTRAMUSCULAR; INTRAVENOUS AS NEEDED
Status: DISCONTINUED | OUTPATIENT
Start: 2019-02-26 | End: 2019-02-26 | Stop reason: HOSPADM

## 2019-02-26 RX ORDER — LIDOCAINE HYDROCHLORIDE 10 MG/ML
INJECTION, SOLUTION EPIDURAL; INFILTRATION; INTRACAUDAL; PERINEURAL
Status: COMPLETED
Start: 2019-02-26 | End: 2019-02-26

## 2019-02-26 RX ORDER — BUPIVACAINE HYDROCHLORIDE AND EPINEPHRINE 5; 5 MG/ML; UG/ML
30 INJECTION, SOLUTION EPIDURAL; INTRACAUDAL; PERINEURAL ONCE
Status: DISCONTINUED | OUTPATIENT
Start: 2019-02-26 | End: 2019-02-26 | Stop reason: HOSPADM

## 2019-02-26 RX ORDER — ROCURONIUM BROMIDE 10 MG/ML
INJECTION, SOLUTION INTRAVENOUS AS NEEDED
Status: DISCONTINUED | OUTPATIENT
Start: 2019-02-26 | End: 2019-02-26 | Stop reason: HOSPADM

## 2019-02-26 RX ADMIN — SODIUM CHLORIDE, POTASSIUM CHLORIDE, SODIUM LACTATE AND CALCIUM CHLORIDE: 600; 310; 30; 20 INJECTION, SOLUTION INTRAVENOUS at 15:18

## 2019-02-26 RX ADMIN — PROPOFOL 180 MG: 10 INJECTION, EMULSION INTRAVENOUS at 14:02

## 2019-02-26 RX ADMIN — FENTANYL CITRATE 100 MCG: 50 INJECTION, SOLUTION INTRAMUSCULAR; INTRAVENOUS at 14:02

## 2019-02-26 RX ADMIN — LIDOCAINE HYDROCHLORIDE 0.1 ML: 10 INJECTION, SOLUTION EPIDURAL; INFILTRATION; INTRACAUDAL; PERINEURAL at 13:00

## 2019-02-26 RX ADMIN — EPHEDRINE SULFATE 20 MG: 50 INJECTION, SOLUTION INTRAVENOUS at 14:16

## 2019-02-26 RX ADMIN — DEXAMETHASONE SODIUM PHOSPHATE 4 MG: 4 INJECTION, SOLUTION INTRA-ARTICULAR; INTRALESIONAL; INTRAMUSCULAR; INTRAVENOUS; SOFT TISSUE at 14:11

## 2019-02-26 RX ADMIN — ONDANSETRON 4 MG: 2 INJECTION INTRAMUSCULAR; INTRAVENOUS at 15:09

## 2019-02-26 RX ADMIN — SODIUM CHLORIDE, POTASSIUM CHLORIDE, SODIUM LACTATE AND CALCIUM CHLORIDE: 600; 310; 30; 20 INJECTION, SOLUTION INTRAVENOUS at 13:56

## 2019-02-26 RX ADMIN — ROCURONIUM BROMIDE 10 MG: 10 INJECTION, SOLUTION INTRAVENOUS at 14:02

## 2019-02-26 RX ADMIN — SUCCINYLCHOLINE CHLORIDE 100 MG: 20 INJECTION INTRAMUSCULAR; INTRAVENOUS at 14:02

## 2019-02-26 RX ADMIN — LIDOCAINE HYDROCHLORIDE 60 MG: 20 INJECTION, SOLUTION EPIDURAL; INFILTRATION; INTRACAUDAL; PERINEURAL at 14:02

## 2019-02-26 RX ADMIN — Medication 2 G: at 14:12

## 2019-02-26 RX ADMIN — MIDAZOLAM HYDROCHLORIDE 2 MG: 1 INJECTION, SOLUTION INTRAMUSCULAR; INTRAVENOUS at 13:56

## 2019-02-26 RX ADMIN — FENTANYL CITRATE 50 MCG: 50 INJECTION, SOLUTION INTRAMUSCULAR; INTRAVENOUS at 14:15

## 2019-02-26 RX ADMIN — FENTANYL CITRATE 100 MCG: 50 INJECTION, SOLUTION INTRAMUSCULAR; INTRAVENOUS at 14:44

## 2019-02-26 NOTE — H&P
HISTORY OF PRESENT ILLNESS  Gillian Escobedo is a 62 y.o. female. HPI  NEW patient presents for consultation at the request of Dr. Stella Herrera for new diagnosis of RIGHT breast cancer diagnosed after an abnormal screening mammogram. Carina Vallejo is not feeling any breast lumps, has no breast pain, no nipple discharge/retractin or skin change.  She has had multiple biopsies in the past, all benign.       01/18/19: RIGHT breast biopsies. PATH as follows:              - Site A, mass: IDC with tubular features, 0.4 cm in greatest dimension in a single core , grade 1, ER+(100%)/TN+(95%)/HER2-.              - Site B, calcifications: Fibrocystic changes with usual ductal hyperplasia, columnar cell hyperplasia, apocrine cysts and calcifications, negative for in situ or invasive carcinoma.     There is no FH of breast or ovarian cancer.     BILATERAL screening mammogram 12/28/18, BIRADS 0, incomplete.     RIGHT diagnostic mammogram and RIGHT breast ultrsaound 1/11/19, suspicious.             Past Medical History:   Diagnosis Date    Hypertension      Mixed connective tissue disease ([facility] [address] 2017     Sees Dr. Cedric Baltazar    Traumatic neuroma, left neck 9/14/2010               Past Surgical History:   Procedure Laterality Date    HX BREAST BIOPSY Bilateral       benign stereo biopsies years ago    HX CHOLECYSTECTOMY        HX TUBAL LIGATION        TN REMOVAL OF ANAL FISSURE        PREP FACE/ORAL PROST PALATAL LIFT             Social History            Socioeconomic History    Marital status:        Spouse name: Not on file    Number of children: Not on file    Years of education: Not on file    Highest education level: Not on file   Social Needs    Financial resource strain: Not on file    Food insecurity - worry: Not on file    Food insecurity - inability: Not on file   [employer] needs - medical: Not on file   [employer] needs - non-medical: Not on file   Occupational History    Not on file   Tobacco Use    Smoking status: Current Every Day Smoker       Packs/day: 1.00       Years: 20.00       Pack years: 20.00    Smokeless tobacco: Never Used   Substance and Sexual Activity    Alcohol use: Yes    Drug use: No    Sexual activity: Yes   Other Topics Concern    Not on file   Social History Narrative    Not on file                Current Outpatient Medications on File Prior to Visit   Medication Sig Dispense Refill    amLODIPine (NORVASC) 5 mg tablet TK 1 T PO QD   6    atorvastatin (LIPITOR) 20 mg tablet TK 1 T PO QD   2    lisinopril-hydroCHLOROthiazide (PRINZIDE, ZESTORETIC) 20-12.5 mg per tablet TK 1 T PO QD   3    aspirin delayed-release 81 mg tablet Take  by mouth daily.        dextroamphetamine-amphetamine (ADDERALL) 20 mg tablet Take 20 mg by mouth.        escitalopram (LEXAPRO) 10 mg tablet Take 20 mg by mouth daily.        omeprazole (PRILOSEC OTC) 20 mg tablet Take 20 mg by mouth daily.          No current facility-administered medications on file prior to visit.          No Known Allergies         OB History      No data available          ROS  Constitutional: Negative.    HENT: Negative.    Eyes: Negative.    Respiratory: Negative.    Cardiovascular: Negative.    Gastrointestinal: Positive for heartburn. Genitourinary: Negative.    Musculoskeletal: Negative.    Skin: Negative.    Neurological: Negative.    Endo/Heme/Allergies: Negative.    Psychiatric/Behavioral: Positive for depression. The patient is nervous/anxious.       Physical Exam   Cardiovascular: Normal rate, regular rhythm and normal heart sounds. Pulmonary/Chest: Breath sounds normal. Right breast exhibits no inverted nipple, no mass, no nipple discharge, no skin change and no tenderness. Left breast exhibits no inverted nipple, no mass, no nipple discharge, no skin change and no tenderness.  Breasts are symmetrical.       Lymphadenopathy:        Right cervical: No superficial cervical, no deep cervical and no posterior cervical adenopathy present. Left cervical: No superficial cervical, no deep cervical and no posterior cervical adenopathy present. She has no axillary adenopathy. Right axillary: No pectoral and no lateral adenopathy present. Left axillary: No pectoral and no lateral adenopathy present.     BREAST ULTRASOUND, Preop planning  Indication:preop planning  right Breast 7-8 o'clock   Technique: The area was scanned using a high-frequency linear-array near-field transducer  Findings: Mass and bx clip  Impression: Breast cancer  Disposition:  Surgery     ASSESSMENT and PLAN      ICD-10-CM ICD-9-CM     1. Invasive ductal carcinoma of breast, female, right ([facility] [address] C50.911 174. 9     2. Breast mass, right N63.10 611.72        Pt presents for consultation re: new diagnosis of RIGHT breast IDC, ER+/WV+/HER2-, clinical stage 1. Dense breasts on exam. US visualizes mass and bx clip at RIGHT breast 7-8:00.     We had a long discussion of options for treatment. A total of 85 minutes were spent face-to-face with the patient during this encounter, accompanied by her  and daughter, and over half of that time was spent on counseling and coordination of care. We discussed in depth the pathology results, and the need for surgery following MRI for extent of disease and surgical planning. The goals of treatment are to treat the breast, and to reduce risk of recurrence of this cancer in the breast or elsewhere in the body.     Discussed treatment options with risks, complications, benefits, and limitations, including lumpectomy with XRT, and mastectomy, both having the same cure rate. Also discussed the placement of BioZorb during a lumpectomy, which functions as both a target for any adjuvant XRT, and as a \"scaffolding\" for breast tissue. The [place] will dissolve in approximately 1.5 years, and leave markers behind.  Also discussed benefit and technique of SLNBx of 3-4 LNs during initial surgery.     We also covered risk reduction strategies as well as post-surgical therapies including XRT, chemotherapy, and hormonal therapy with estrogen blocker. If chemo is not needed, XRT will start 3-4 weeks after surgery. If chemo is necessary, XRT will follow chemo. XRT treatments after a lumpectomy will be 5 days/week, and will last for 4-6 weeks, depending on LN involvement. Pt would not likely need XRT after a mastectomy, except in the case of LN involvement. Will send for MammaPrint to help determine whether pt will benefit from chemotherapy. This will also be determined in part by final tumor size and LN involvement. Pt will not likely need chemo for small, clinically low risk tumor, but if MammaPrint indicates high risk tumor, it will be important for her to stay on estrogen blocker. Discussed that estrogen blocking pill will further reduce the risk of recurrence, as pts cancer is ER+. Side effects of estrogen blocker may include hot flashes. Side effects of XRT may include fatigue, local skin changes, and joint pain.     We also discussed the pts questions and concerns. Lumpectomy is an outpatient procedure, with stitches below the skin, and waterproof glue over the incision. Will plan for circumareolar incision at LOQ of areola. Pt may be able to manage post-op pain with Tylenol. Expected 1-2 week recovery period after lumpectomy. Pt notes that she works from home - she can return working as soon as she feels well.     Pt has elected to proceed with lumpectomy. Will order MRI and plan further from there - radiology will call pt to schedule. This plan was reviewed with the patient and patient agrees. All questions were answered.

## 2019-02-26 NOTE — DISCHARGE INSTRUCTIONS
Discharge Instructions from Dr. Kalani Thompson    · I will call you with the pathology results, typically within 1 week from today. · You may shower, but no hot tubs, swimming pools, or baths until your incision is healed. · No heavy lifting with the affected extremity (nothing greater than 5 pounds), and limit its use for the next 4-5 days. · You may use an ice pack for comfort for the next couple of days, but do not place ice directly on the skin. Rather, use a towel or clothing to serve as a barrier between skin and ice to prevent injury. · If I placed a drain, follow the drain instructions provided, especially as you keep a record of the drain output. · Follow medication instructions carefully. · Watch for signs of infection as listed below. · Redness  · Swelling  · Drainage from the incision or from your nipple that appears infected  · Fever over 101 degrees for consecutive readings, or over 99.5 if you are currently undergoing chemotherapy. · Call our office (number is below) for a follow-up appointment. · If you have any problems, our phone number is [phone]. DISCHARGE SUMMARY from your Nurse      PATIENT INSTRUCTIONS    After general anesthesia or intravenous sedation, for 24 hours or while taking prescription Narcotics:  · Limit your activities  · Do not drive and operate hazardous machinery  · Do not make important personal or business decisions  · Do  not drink alcoholic beverages  · If you have not urinated within 8 hours after discharge, please contact your surgeon on call.     Report the following to your surgeon:  · Excessive pain, swelling, redness or odor of or around the surgical area  · Temperature over 100.5  · Nausea and vomiting lasting longer than 4 hours or if unable to take medications  · Any signs of decreased circulation or nerve impairment to extremity: change in color, persistent  numbness, tingling, coldness or increase pain  · Any questions      COUGH AND DEEP BREATHE    Breathing deeply and coughing are very important exercises to do after surgery. Deep breathing and coughing open the little air tubes and air sacks in your lungs. You take deep breaths every day. You may not even notice - it is just something you do when you sigh or yawn. It is a natural exercise you do to keep these air passages open. After surgery, take deep breaths and cough, on purpose. DIRECTIONS:  · Take 10 to 15 slow deep breaths every hour while awake. · Breathe in deeply, and hold it for 2 seconds. · Exhale slowly through puckered lips, like blowing up a balloon. · After every 4th or 5th deep breath, hug your pillow to your chest or belly and give a hard, deep cough. Yes, it will probably hurt. But doing this exercise is a very important part of healing after surgery. Take your pain medicine to help you do this exercise without too much pain. Coughing and deep breathing help prevent bronchitis and pneumonia after surgery. If you had chest or belly surgery, use a pillow as a \"hug dougie\" and hold it tightly to your chest or belly when you cough. ANKLE PUMPS    Ankle pumps increase the circulation of oxygenated blood to your lower extremities and decrease your risk for circulation problems such as blood clots. They also stretch the muscles, tendons and ligaments in your foot and ankle, and prevent joint contracture in the ankle and foot, especially after surgeries on the legs. It is important to do ankle pump exercises regularly after surgery because immobility increases your risk for developing a blood clot. Your doctor may also have you take an Aspirin for the next few days as well. If your doctor did not ask you to take an Aspirin, consult with him before starting Aspirin therapy on your own. The exercise is quite simple.      · Slowly point your foot forward, feeling the muscles on the top of your lower leg stretch, and hold this position for 5 seconds. · Next, pull your foot back toward you as far as possible, stretching the calf muscles, and hold that position for 5 seconds. · Repeat with the other foot. · Perform 10 repetitions every hour while awake for both ankles if possible (down and then up with the foot once is one repetition). You should feel gentle stretching of the muscles in your lower leg when doing this exercise. If you feel pain, or your range of motion is limited, don't push too hard. Only go the limit your joint and muscles will let you go. If you have increasing pain, progressively worsening leg warmth or swelling, STOP the exercise and call your doctor. MEDICATION AND   SIDE EFFECT GUIDE    The [facility] MEDICATION AND SIDE EFFECT GUIDE was provided to the PATIENT AND CARE PROVIDER. Information provided includes instruction about drug purpose and common side effects for the following medications:   · Norco        These are general instructions for a healthy lifestyle:    *   Please give a list of your current medications to your Primary Care Provider. *   Please update this list whenever your medications are discontinued, doses are changed, or new medications (including over-the-counter products) are added. *   Please carry medication information at all times in case of emergency situations. About Smoking  No smoking / No tobacco products  Avoid exposure to second hand smoke     Surgeon General's Warning:  Quitting smoking now greatly reduces serious risk to your health. Obesity, smoking, and sedentary lifestyle greatly increases your risk for illness and disease. A healthy diet, regular physical exercise & weight monitoring are important for maintaining a healthy lifestyle.       Congestive Heart Failure  You may be retaining fluid if you have a history of heart failure or if you experience any of the following symptoms:  Weight gain of 3 pounds or more overnight or 5 pounds in a week, increased swelling in your hands or feet or shortness of breath while lying flat in bed. Please call your doctor as soon as you notice any of these symptoms; do not wait until your next office visit. Recognize signs and symptoms of STROKE:  F -  Face looks uneven  A -  Arms unable to move or move evenly  S -  Speech slurred or non-existent  T -  Time-call 911 as soon as signs and symptoms begin-DO NOT go          back to bed or wait to see if you get better-TIME IS BRAIN. Warning Signs of HEART ATTACK   Call 911 if you have these symptoms:     Chest discomfort. Most heart attacks involve discomfort in the center of the chest that lasts more than a few minutes, or that goes away and comes back. It can feel like uncomfortable pressure, squeezing, fullness, or pain.  Discomfort in other areas of the upper body. Symptoms can include pain or discomfort in one or both arms, the back, neck, jaw, or stomach.  Shortness of breath with or without chest discomfort.  Other signs may include breaking out in a cold sweat, nausea, or lightheadedness. Don't wait more than five minutes to call 911 - MINUTES MATTER! Fast action can save your life. Calling 911 is almost always the fastest way to get lifesaving treatment. Emergency Medical Services staff can begin treatment when they arrive -- up to an hour sooner than if someone gets to the hospital by car. The discharge information has been reviewed with the patient and caregiver. Any questions and concerns from the patient and caregiver have been addressed. The patient and caregiver verbalized understanding.         Other information in your discharge envelope:  [x]     PRESCRIPTIONS  []     PHYSICAL THERAPY PRESCRIPTION  []     APPOINTMENT CARDS  []     Regional Anesthesia Pamphlet for block or block with On-Q Catheter from   Anesthesia Service  []     Medical device information sheets/pamphlets from their    []     School/work excuse note. []     /parent work excuse note. The following personal items collected during your admission are returned to you:   Dental Appliance:    Vision:    Hearing Aid:    Jewelry:    Clothing: Clothing: (denies valuables. clothing to locker)  Other Valuables:    Valuables sent to safe:

## 2019-02-26 NOTE — ANESTHESIA POSTPROCEDURE EVALUATION
Procedure(s):  RIGHT BREAST LUMPECTOMY WITH ULTRASOUND AND RIGHT BREAST SENTINEL NODE BIOPSY  SENTINEL NODE BIOPSY.     Anesthesia Post Evaluation        Patient location during evaluation: PACU  Level of consciousness: awake  Pain management: adequate  Airway patency: patent  Anesthetic complications: no  Cardiovascular status: acceptable  Respiratory status: acceptable  Hydration status: acceptable        Visit Vitals  /60   Pulse 73   Temp 36.3 °C (97.4 °F)   Resp 16   Ht 5' 1\" (1.549 m)   Wt 79.4 kg (175 lb 0.7 oz)   SpO2 92%   BMI 33.07 kg/m²

## 2019-02-26 NOTE — BRIEF OP NOTE
BRIEF OPERATIVE NOTE    Date of Procedure: 2/26/2019   Preoperative Diagnosis: RIGHT BREAST CANCER  Postoperative Diagnosis: RIGHT BREAST CANCER    Procedure(s):  RIGHT BREAST LUMPECTOMY WITH ULTRASOUND AND RIGHT BREAST SENTINEL NODE BIOPSY  SENTINEL NODE BIOPSY  Surgeon(s) and Role:     * Emeka Elizabeth MD - Primary         Surgical Assistant: Fito Brown    Surgical Staff:  Circ-1: Sonja Dominguez  Scrub Tech-1: Norman Germain  Surg Asst-1: Estefani Richard RN  Surg Asst-Relief: Jodi Khoury  Event Time In Time Out   Incision Start 1423    Incision Close       Anesthesia: General   Estimated Blood Loss: minimal  Specimens:   ID Type Source Tests Collected by Time Destination   1 : Right axillary sentinel node  Preservative Lymph Node  Emeka Elizabeth MD 2/26/2019 1435 Pathology   2 : Right breast lumpectomy Preservative Breast  Emeka Elizabeth MD 2/26/2019 1452 Pathology   3 : Right breast inferior lateral margin Preservative Breast  Emeka Elizabeth MD 2/26/2019 1455 Pathology      Findings: sent nodes x 1, 1 x 2 x 2 LP biozorb   Complications: none  Implants:   Implant Name Type Inv.  Item Serial No.  Lot No. LRB No. Used Action   BioZorb LP    [employer] [id number] Right 1 Implanted

## 2019-02-26 NOTE — PERIOP NOTES
6855  \"I am ready to get dressed. \"  Pt sitting on stretcher, pt dressed. IV discontinued. Written discharge instructions and prescription given to spouse. Pt reports unable to void at this time. Pt transfer to wheelchair. 5  Pt discharge to car accompanied by family.

## 2019-02-26 NOTE — ANESTHESIA PREPROCEDURE EVALUATION
Anesthetic History   No history of anesthetic complications            Review of Systems / Medical History  Patient summary reviewed, nursing notes reviewed and pertinent labs reviewed    Pulmonary          Smoker         Neuro/Psych         Psychiatric history     Cardiovascular    Hypertension          PAD (had vascular stent placed as outpatient 3 years ago) and hyperlipidemia    Exercise tolerance: >4 METS     GI/Hepatic/Renal     GERD           Endo/Other  Within defined limits           Other Findings            Physical Exam    Airway  Mallampati: II  TM Distance: 4 - 6 cm  Neck ROM: normal range of motion   Mouth opening: Normal     Cardiovascular    Rhythm: regular  Rate: normal         Dental    Dentition: Lower dentition intact, Upper dentition intact and Caps/crowns     Pulmonary  Breath sounds clear to auscultation               Abdominal         Other Findings            Anesthetic Plan    ASA: 3  Anesthesia type: general          Induction: Intravenous  Anesthetic plan and risks discussed with: Patient

## 2019-02-27 NOTE — OP NOTES
[facility]  OPERATIVE REPORT    Name:  Tena Talamantes  MR#:  [id number]  :  1960  ACCOUNT #:  [de-identified]  DATE OF SERVICE:  2019      PREOPERATIVE DIAGNOSIS:  Carcinoma of the right breast.    POSTOPERATIVE DIAGNOSIS:  Carcinoma of the right breast.    PROCEDURE PERFORMED:  Right lumpectomy with tissue advancement flaps of 54 sq. cm., right sentinel lymph node biopsy. SURGEON:  Lori Morfin MD    ASSISTANT:  Mari Denise    ANESTHESIA:  General.    COMPLICATIONS:  None. SPECIMENS REMOVED:  Right breast tissue and right axillary sentinel lymph nodes. IMPLANTS:  A 1 x 2 x 2 low-profile BioZorb. ESTIMATED BLOOD LOSS:  Minimal.     INDICATIONS:  The patient is a 78-year-old female with biopsy-proven adenocarcinoma of the right breast.    PROCEDURE:  After lymphoscintigraphy in Radiology, the patient was brought to the operating room. After satisfactory induction of general anesthesia, the patient was prepped and draped in sterile fashion. An axillary incision was made and deepened through subcutaneous tissues with Bovie cautery. The axilla was entered and sentinel nodes were identified. They were enlarged but felt fatty replaced and benign. They were sent for permanent section. The axilla was made hemostatic, anesthetized with 0.5% Marcaine, and was closed with interrupted 3-0 Vicryl and running subcuticular 4-0 Monocryl on skin. Attention was then turned to the breast where an intraoperative ultrasound was performed and the breast was marked. A clip was identified at the 6 o'clock position but the tumor was approximately 1.5 cm above the clip and there was a papillary abnormality on the circumareolar area at 6 o'clock as well as an ultrasound abnormality. A circumareolar incision was made and deepened through subcutaneous tissues with the Bovie cautery.   A standard lumpectomy was then performed down to chest wall with additional anterior and lateral margins obtained. The specimens were oriented and sent to Pathology. Tissue advancement flaps were created over a distance of 9 cm x 3 cm superiorly and inferiorly for a total advancement of 54 sq. cm. A fiducial marker in the form of a 1 x 2 x 2 low-profile BioZorb 3-dimensional bioprosthesis was then placed in the tumor bed and secured with interrupted 2-0 PDS sutures. This was utilized for radiological identification of the site of the cancer, for postoperative radiation therapy, and to provide a scaffolding to provide a better cosmetic outcome. The parenchymal flaps created by the tissue advancement were then closed with interrupted 3-0 Vicryl sutures. Subcutaneous tissue was reapproximated with interrupted 3-0 Vicryl and skin closed with running subcuticular 4-0 Monocryl. The patient tolerated the procedure well. There were no immediate complications. She was taken to the recovery room in stable condition.       Gutierrez Rose MD      [id number]  D:  02/26/2019 16:58  T:  02/27/2019 4:15  JOB #:  [id number]  CC:  Dr. Kat Swanson

## 2019-03-05 ENCOUNTER — DOCUMENTATION ONLY (OUTPATIENT)
Dept: SURGERY | Age: 59
End: 2019-03-05

## 2019-03-05 NOTE — PROGRESS NOTES
TRF for mammaprint faxed to [facility].. Confirmation fax received. Insurance letter mailed to patient.

## 2019-03-13 ENCOUNTER — TELEPHONE (OUTPATIENT)
Dept: SURGERY | Age: 59
End: 2019-03-13

## 2019-03-13 NOTE — TELEPHONE ENCOUNTER
Called patient with results of mammaprint - ultra low. No answer. Left message that everything is good. Instructed her to call office tomorrow.

## 2019-03-14 ENCOUNTER — TELEPHONE (OUTPATIENT)
Dept: SURGERY | Age: 59
End: 2019-03-14

## 2019-03-14 NOTE — TELEPHONE ENCOUNTER
Called patient again with results of mammaprint which are low risk. No answer. Left message with office call back number. I am going to mail copy of mammaprint results to address listed in chart.

## 2019-03-18 ENCOUNTER — OFFICE VISIT (OUTPATIENT)
Dept: SURGERY | Age: 59
End: 2019-03-18

## 2019-03-18 VITALS
SYSTOLIC BLOOD PRESSURE: 108 MMHG | HEART RATE: 76 BPM | HEIGHT: 61 IN | BODY MASS INDEX: 33.04 KG/M2 | DIASTOLIC BLOOD PRESSURE: 66 MMHG | WEIGHT: 175 LBS

## 2019-03-18 DIAGNOSIS — C50.911 INVASIVE DUCTAL CARCINOMA OF BREAST, FEMALE, RIGHT (HCC): Primary | ICD-10-CM

## 2019-03-18 NOTE — PROGRESS NOTES
HISTORY OF PRESENT ILLNESS  Conor Valdivia is a 62 y.o. female. HPI  ESTABLISHED patient here for 3 week post-op check, s/p RIGHT breast lumpectomy, RIGHT breast SLNBx. She is here with her  and states she's feeling well, feels her incision has healed well. She states she had some slight redness, but used neosporin for a couple nights and this improved. She has no pain at this time.     01/18/19: RIGHT breast biopsies. PATH as follows:   - Site A, mass: IDC with tubular features, 0.4 cm in greatest dimension in a single core, grade 1, ER+(100%)/WV+(95%)/HER2-. Clinical stage 1.   - Site B, calcifications: Fibrocystic changes with usual ductal hyperplasia, columnar cell hyperplasia, apocrine cysts and calcifications, negative for in situ or invasive carcinoma. Jan/FebFeb 2019: MammaPrint, insufficient amount for testing.    02/26/19: RIGHT lumpectomy and SLNBx. 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 DCIS present, involves over 50% of the tumor, negative margins. 0/6 LNs involved. Pathologic stage: pT1c, pN0.     Feb/Mar 2019: MammaPrint, low risk lumimal type A, +0.411.     There is no FH of breast or ovarian cancer.     BILATERAL screening mammogram 12/28/18, BIRADS 0, incomplete.     RIGHT diagnostic mammogram and RIGHT breast ultrsaound 1/11/19, suspicious.      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 Antonino Brand    Traumatic neuroma, left neck 09/14/2010       Past Surgical History:   Procedure Laterality Date    HX BREAST BIOPSY Bilateral     benign stereo biopsies years ago    HX BREAST LUMPECTOMY Right 2/26/2019    RIGHT BREAST LUMPECTOMY WITH ULTRASOUND AND RIGHT BREAST SENTINEL NODE BIOPSY performed by Deo Servin MD at [facility] OR    HX CHOLECYSTECTOMY      HX HERNIA REPAIR      Umbilical Hernia Repair    HX TUBAL LIGATION      GA REMOVAL OF ANAL FISSURE      PREP FACE/ORAL PROST PALATAL LIFT         Social History     Socioeconomic History    Marital status:      Spouse name: Not on file    Number of children: Not on file    Years of education: Not on file    Highest education level: Not on file   Social Needs    Financial resource strain: Not on file    Food insecurity - worry: Not on file    Food insecurity - inability: Not on file    Transportation needs - medical: Not on file   [employer] needs - non-medical: Not on file   Occupational History    Not on file   Tobacco Use    Smoking status: Current Every Day Smoker     Packs/day: 1.00     Years: 20.00     Pack years: 20.00    Smokeless tobacco: Never Used   Substance and Sexual Activity    Alcohol use: Yes     Alcohol/week: 1.2 oz     Types: 2 Shots of liquor per week     Comment: rarely   2 drinks 2x/year    Drug use: No    Sexual activity: Yes   Other Topics Concern    Not on file   Social History Narrative    Not on file       Current Outpatient Medications on File Prior to Visit   Medication Sig Dispense Refill    naproxen sod/diphenhydramine (ALEVE PM PO) Take 2 Tabs by mouth nightly.  amLODIPine (NORVASC) 5 mg tablet TK 1 T PO QD  6    atorvastatin (LIPITOR) 20 mg tablet TK 1 T PO QD  2    lisinopril-hydroCHLOROthiazide (PRINZIDE, ZESTORETIC) 20-12.5 mg per tablet TK 1 T PO QD  3    aspirin delayed-release 81 mg tablet Take 81 mg by mouth daily.  dextroamphetamine-amphetamine (ADDERALL) 20 mg tablet Take 20 mg by mouth two (2) times a day.  escitalopram oxalate (LEXAPRO) 20 mg tablet Take 20 mg by mouth daily.  omeprazole (PRILOSEC OTC) 20 mg tablet Take 20 mg by mouth daily. No current facility-administered medications on file prior to visit. No Known Allergies    OB History     Obstetric Comments    Menarche 15, LMP 2016, # of children 2, age of 4st delivery 34, Hysterectomy/oophorectomy No/No/, Breast bx Yes, numerous, history of breast feeding No, BCP No, Hormone therapy No          ROS    Physical Exam   Cardiovascular: Normal rate, regular rhythm and normal heart sounds. Pulmonary/Chest: Breath sounds normal. Right breast exhibits no inverted nipple, no mass, no nipple discharge, no skin change and no tenderness. Left breast exhibits no inverted nipple, no mass, no nipple discharge, no skin change and no tenderness. Breasts are symmetrical.       Lymphadenopathy:        Right cervical: No superficial cervical, no deep cervical and no posterior cervical adenopathy present. Left cervical: No superficial cervical, no deep cervical and no posterior cervical adenopathy present. She has no axillary adenopathy. Right axillary: No pectoral and no lateral adenopathy present. Left axillary: No pectoral and no lateral adenopathy present. ASSESSMENT and PLAN    ICD-10-CM ICD-9-CM    1. Invasive ductal carcinoma of breast, female, right ([facility] [address] C50.911 174.9 REFERRAL TO RADIATION ONCOLOGY      REFERRAL TO ONCOLOGY   2. Malignant neoplasm of right female breast, unspecified estrogen receptor status, unspecified site of breast ([facility] [address] C50.911 174.9       Patient presents for f/u s/p RIGHT lumpectomy and SLNBx on 02/26/19, and is doing well overall. Well healed incisions s/p lumpectomy and SLNBx, no evidence of local recurrence. Small seroma at RIGHT axilla, asymptomatic and expected to resolve. Reviewed ultra low risk MammaPrint results. Recommend pt stay on AI for at least 5 years, after completion of XRT. Will refer to radiation oncology and medical oncology at [facility] for further consultation. F/U in 7 months elena Lindsay NP, after LEFT diagnostic mammo. This plan was reviewed with the patient and patient agrees.  All questions were answered.     Written by Hero Jamil, as dictated by Dr. Shira Anne MD.

## 2019-03-18 NOTE — PROGRESS NOTES
HISTORY OF PRESENT ILLNESS  Pernell Harris is a 62 y.o. female. HPI ESTABLISHED patient here for 3 week post-op check, s/p RIGHT breast lumpectomy, RIGHT breast SLNBx. She is here with her  and states she's feeling well, feels her incision has healed well. She states she had some slight redness, but used neosporin for a couple nights and this improved. She has no pain at this time. 01/18/19: RIGHT breast biopsies. PATH as follows:   - Site A, mass: IDC with tubular features, 0.4 cm in greatest dimension in a single core , grade 1, ER+(100%)/SC+(95%)/HER2-. Clinical stage 1.   - Site B, calcifications: Fibrocystic changes with usual ductal hyperplasia, columnar cell hyperplasia, apocrine cysts and calcifications, negative for in situ or invasive carcinoma. Jan/Feb 2019: MammaPrint, insufficient amount for testing.    02/26/19: RIGHT lumpectomy and SLNBx. 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 DCIS present, involves over 50% of the tumor, negative margins. 0/6 LNs involved. Pathologic stage: pT1c, pN0.           There is no FH of breast or ovarian cancer.     BILATERAL screening mammogram 12/28/18, BIRADS 0, incomplete.     RIGHT diagnostic mammogram and RIGHT breast ultrsaound 1/11/19, suspicious.    ROS    Physical Exam    ASSESSMENT and PLAN  {ASSESSMENT/PLAN:92070}

## 2019-03-18 NOTE — PATIENT INSTRUCTIONS

## 2019-03-20 NOTE — COMMUNICATION BODY
HISTORY OF PRESENT ILLNESS  Layo Moran is a 62 y.o. female. HPI  ESTABLISHED patient here for 3 week post-op check, s/p RIGHT breast lumpectomy, RIGHT breast SLNBx. She is here with her  and states she's feeling well, feels her incision has healed well. She states she had some slight redness, but used neosporin for a couple nights and this improved. She has no pain at this time.     01/18/19: RIGHT breast biopsies. PATH as follows:   - Site A, mass: IDC with tubular features, 0.4 cm in greatest dimension in a single core, grade 1, ER+(100%)/NE+(95%)/HER2-. Clinical stage 1.   - Site B, calcifications: Fibrocystic changes with usual ductal hyperplasia, columnar cell hyperplasia, apocrine cysts and calcifications, negative for in situ or invasive carcinoma. Jan/Feb 2019: MammaPrint, insufficient amount for testing.    02/26/19: RIGHT lumpectomy and SLNBx. 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 DCIS present, involves over 50% of the tumor, negative margins. 0/6 LNs involved. Pathologic stage: pT1c, pN0.     Feb/Mar 2019: MammaPrint, low risk lumimal type A, +0.411.     There is no FH of breast or ovarian cancer.     BILATERAL screening mammogram 12/28/18, BIRADS 0, incomplete.     RIGHT diagnostic mammogram and RIGHT breast ultrsaound 1/11/19, suspicious.      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 Karen Gonsalez    Traumatic neuroma, left neck 09/14/2010       Past Surgical History:   Procedure Laterality Date    HX BREAST BIOPSY Bilateral     benign stereo biopsies years ago    HX BREAST LUMPECTOMY Right 2/26/2019    RIGHT BREAST LUMPECTOMY WITH ULTRASOUND AND RIGHT BREAST SENTINEL NODE BIOPSY performed by Brandon Morel [facility] at [facility]    HX CHOLECYSTECTOMY      HX HERNIA REPAIR      Umbilical Hernia Repair    HX TUBAL LIGATION      [place] REMOVAL OF ANAL FISSURE      PREP FACE/ORAL PROST PALATAL LIFT         Social History     Socioeconomic History    Marital status:      Spouse name: Not on file    Number of children: Not on file    Years of education: Not on file    Highest education level: Not on file   Social Needs    Financial resource strain: Not on file    Food insecurity - worry: Not on file    Food insecurity - inability: Not on file    Transportation needs - medical: Not on file   [employer] needs - non-medical: Not on file   Occupational History    Not on file   Tobacco Use    Smoking status: Current Every Day Smoker     Packs/day: 1.00     Years: 20.00     Pack years: 20.00    Smokeless tobacco: Never Used   Substance and Sexual Activity    Alcohol use: Yes     Alcohol/week: 1.2 oz     Types: 2 Shots of liquor per week     Comment: rarely   2 drinks 2x/year    Drug use: No    Sexual activity: Yes   Other Topics Concern    Not on file   Social History Narrative    Not on file       Current Outpatient Medications on File Prior to Visit   Medication Sig Dispense Refill    naproxen sod/diphenhydramine (ALEVE PM PO) Take 2 Tabs by mouth nightly.  amLODIPine (NORVASC) 5 mg tablet TK 1 T PO QD  6    atorvastatin (LIPITOR) 20 mg tablet TK 1 T PO QD  2    lisinopril-hydroCHLOROthiazide (PRINZIDE, ZESTORETIC) 20-12.5 mg per tablet TK 1 T PO QD  3    aspirin delayed-release 81 mg tablet Take 81 mg by mouth daily.  dextroamphetamine-amphetamine (ADDERALL) 20 mg tablet Take 20 mg by mouth two (2) times a day.  escitalopram oxalate (LEXAPRO) 20 mg tablet Take 20 mg by mouth daily.  omeprazole (PRILOSEC OTC) 20 mg tablet Take 20 mg by mouth daily. No current facility-administered medications on file prior to visit. No Known Allergies    OB History     Obstetric Comments    Menarche 15, LMP 2016, # of children 2, age of 4st delivery 34, Hysterectomy/oophorectomy No/No/, Breast bx Yes, numerous, history of breast feeding No, BCP No, Hormone therapy No          ROS    Physical Exam   Cardiovascular: Normal rate, regular rhythm and normal heart sounds. Pulmonary/Chest: Breath sounds normal. Right breast exhibits no inverted nipple, no mass, no nipple discharge, no skin change and no tenderness. Left breast exhibits no inverted nipple, no mass, no nipple discharge, no skin change and no tenderness. Breasts are symmetrical.       Lymphadenopathy:        Right cervical: No superficial cervical, no deep cervical and no posterior cervical adenopathy present. Left cervical: No superficial cervical, no deep cervical and no posterior cervical adenopathy present. She has no axillary adenopathy. Right axillary: No pectoral and no lateral adenopathy present. Left axillary: No pectoral and no lateral adenopathy present. ASSESSMENT and PLAN    ICD-10-CM ICD-9-CM    1. Invasive ductal carcinoma of breast, female, right ([facility] C50.911 174.9 REFERRAL TO RADIATION ONCOLOGY      REFERRAL TO ONCOLOGY   2. Malignant neoplasm of right female breast, unspecified estrogen receptor status, unspecified site of breast ([facility] [facility] C50.911 174.9       Patient presents for f/u s/p RIGHT lumpectomy and SLNBx on 02/26/19, and is doing well overall. Well healed incisions s/p lumpectomy and SLNBx, no evidence of local recurrence. Small seroma at RIGHT axilla, asymptomatic and expected to resolve. Reviewed ultra low risk MammaPrint results. Recommend pt stay on AI for at least 5 years, after completion of XRT. Will refer to radiation oncology and medical oncology at [facility] for further consultation. F/U in 7 months elena Ortiz NP, after LEFT diagnostic mammo. This plan was reviewed with the patient and patient agrees.  All questions were answered.     Written by Sonia Talbot, as dictated by Dr. Bessie Toth MD.

## 2019-03-27 ENCOUNTER — OFFICE VISIT (OUTPATIENT)
Dept: ONCOLOGY | Age: 59
End: 2019-03-27

## 2019-03-27 VITALS
DIASTOLIC BLOOD PRESSURE: 65 MMHG | OXYGEN SATURATION: 100 % | SYSTOLIC BLOOD PRESSURE: 103 MMHG | HEIGHT: 61 IN | WEIGHT: 179.8 LBS | TEMPERATURE: 96.9 F | RESPIRATION RATE: 16 BRPM | HEART RATE: 72 BPM | BODY MASS INDEX: 33.95 KG/M2

## 2019-03-27 DIAGNOSIS — Z78.0 POSTMENOPAUSAL: ICD-10-CM

## 2019-03-27 DIAGNOSIS — C50.911 INVASIVE DUCTAL CARCINOMA OF BREAST, FEMALE, RIGHT (HCC): Primary | ICD-10-CM

## 2019-03-27 RX ORDER — ANASTROZOLE 1 MG/1
1 TABLET ORAL DAILY
Qty: 90 TAB | Refills: 3 | Status: SHIPPED | OUTPATIENT
Start: 2019-03-27 | End: 2020-03-30

## 2019-03-27 RX ORDER — TRETINOIN 1 MG/G
CREAM TOPICAL
Refills: 3 | COMMUNITY
Start: 2019-01-12 | End: 2021-11-12

## 2019-03-27 NOTE — PATIENT INSTRUCTIONS
Anastrozole (By mouth)   Anastrozole (an-AS-troe-zole)  Treats breast cancer. Brand Name(s): Arimidex   There may be other brand names for this medicine. When This Medicine Should Not Be Used: This medicine is not right for everyone. Do not use it if you had an allergic reaction to anastrozole, if you are pregnant, or if you have not stopped menstruating (premenopausal). How to Use This Medicine:   Tablet  · Medicines used to treat cancer are very strong and can have many side effects. Before receiving this medicine, make sure you understand all the risks and benefits. It is important for you to work closely with your doctor during your treatment. · Your doctor will tell you how much medicine to use. Do not use more than directed. · Read and follow the patient instructions that come with this medicine. Talk to your doctor or pharmacist if you have any questions. · Missed dose: Take a dose as soon as you remember. If it is almost time for your next dose, wait until then and take a regular dose. Do not take extra medicine to make up for a missed dose. · If you vomit after taking your medicine, call your doctor or pharmacist for instructions. · Store the medicine in a closed container at room temperature, away from heat, moisture, and direct light. · Ask your pharmacist, doctor, or health caregiver about the best way to dispose of any leftover medicine after you have finished your treatment. You will also need to throw away old medicine after the expiration date has passed. Drugs and Foods to Avoid:   Ask your doctor or pharmacist before using any other medicine, including over-the-counter medicines, vitamins, and herbal products. · Some medicines can affect how anastrozole works.  Tell your doctor if you are taking any of the following:   ¨ Medicine that contains estrogen  ¨ Tamoxifen  Warnings While Using This Medicine:   · Pregnancy after menopause is not likely, but if you think you could be pregnant, tell your doctor. This medicine could harm an unborn baby. · Tell your doctor if you are breastfeeding, or if you have liver disease, bone problems (such as osteoporosis), high cholesterol, or heart disease. · This medicine may cause the following problems:   ¨ Increased risk for weak bones or osteoporosis  ¨ Increased cholesterol levels  ¨ Increased risk for heart attack or stroke  · Your doctor will do lab tests at regular visits to check on the effects of this medicine. Keep all appointments. · Cancer medicine can cause nausea or vomiting, sometimes even after you receive medicine to prevent these effects. Ask your doctor or nurse about other ways to control any nausea or vomiting that might happen. · Keep all medicine out of the reach of children. Never share your medicine with anyone. Possible Side Effects While Using This Medicine:   Call your doctor right away if you notice any of these side effects:  · Allergic reaction: Itching or hives, swelling in your face or hands, swelling or tingling in your mouth or throat, chest tightness, trouble breathing  · Back, bone, joint, or muscle pain  · Blistering, peeling, or red skin rash  · Chest pain or shortness of breath  · Fever, chills, cough, sore throat, and body aches  · Numbness, tingling, or burning pain in your hands, arms, legs, or feet  · Rapid weight gain, or swelling in your hands, ankles, or feet  · Severe diarrhea, nausea, or vomiting  · Vaginal bleeding or discharge  · Yellowing of your skin or the whites of your eyes  If you notice these less serious side effects, talk with your doctor:   · Breast pain  · Dizziness, headache, tiredness, or weakness  · Mood changes, anxiety, or irritability  · Trouble sleeping  · Warmth or redness in your face, neck, arms, or upper chest  If you notice other side effects that you think are caused by this medicine, tell your doctor. Call your doctor for medical advice about side effects.  You may report side effects to FDA at 1-079-FDA-3840  © 2017 [address]  Information is for End User's use only and may not be sold, redistributed or otherwise used for commercial purposes. The above information is an  only. It is not intended as medical advice for individual conditions or treatments. Talk to your doctor, nurse or pharmacist before following any medical regimen to see if it is safe and effective for you.

## 2019-03-27 NOTE — PROGRESS NOTES
Pernell Harris is a 62 y.o. female here as a new patient for evaluation of therapy for breast cancer.

## 2019-03-27 NOTE — PROGRESS NOTES
[facility] [place] at [address], [address] [facility]  Hiro Chalk: [phone]  F: [phone]      Reason for Visit:   Daphne Vo is a 62 y.o. female who is seen in consultation at the request of Dr. Downey Reasons for evaluation of systemic therapy for breast cancer    Consulting physician:  Dr. Krishna Mason    Treatment History:   · 1/18/19 right breast core bx: IDC with tubular features, gr 1, 0.4 cm, ER + at 100%, MN + at 95%, HER 2 negative at [facility] 0; site B, negative  · 2/26/19 right lumpectomy:  Invasive tubular carcinoma, 1.7 cm, gr 1, DCIS present, involving over 50% of tumor, cribriform, gr 1; no LVI, 0/6 LN, pT1c pN0 cM0  · mammaprint show low risk luminal A    History of Present Illness: An abnormal mammogram led to the diagnosis above.     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 Marcella Canchola Traumatic neuroma, left neck 09/14/2010      Past Surgical History:   Procedure Laterality Date    HX BREAST BIOPSY Bilateral     benign stereo biopsies years ago    HX BREAST LUMPECTOMY Right 2/26/2019    RIGHT BREAST LUMPECTOMY WITH ULTRASOUND AND RIGHT BREAST SENTINEL NODE BIOPSY performed by Lore Upton MD at [address] [facility]    HX CHOLECYSTECTOMY      HX HERNIA REPAIR      Umbilical Hernia Repair    HX TUBAL LIGATION      [place] REMOVAL OF ANAL FISSURE      PREP FACE/ORAL PROST PALATAL LIFT        Social History     Tobacco Use    Smoking status: Current Every Day Smoker     Packs/day: 1.00     Years: 20.00     Pack years: 20.00    Smokeless tobacco: Never Used Substance Use Topics    Alcohol use: Yes     Alcohol/week: 1.2 oz     Types: 2 Shots of liquor per week     Comment: rarely   2 drinks 2x/year      Family History   Problem Relation Age of Onset    Cancer Mother         Colon    Cancer Father         Lung    Cancer Brother         Brain    Cancer Paternal Aunt         Brain     Current Outpatient Medications   Medication Sig    tretinoin (RETIN-A) 0.1 % topical cream Apply  to affected area.  anastrozole (ARIMIDEX) 1 mg tablet Take 1 mg by mouth daily.  naproxen sod/diphenhydramine (ALEVE PM PO) Take 2 Tabs by mouth nightly.  amLODIPine (NORVASC) 5 mg tablet TK 1 T PO QD    atorvastatin (LIPITOR) 20 mg tablet TK 1 T PO QD    lisinopril-hydroCHLOROthiazide (PRINZIDE, ZESTORETIC) 20-12.5 mg per tablet TK 1 T PO QD    aspirin delayed-release 81 mg tablet Take 81 mg by mouth daily.  dextroamphetamine-amphetamine (ADDERALL) 20 mg tablet Take 20 mg by mouth two (2) times a day.  escitalopram oxalate (LEXAPRO) 20 mg tablet Take 20 mg by mouth daily.  omeprazole (PRILOSEC OTC) 20 mg tablet Take 20 mg by mouth daily. No current facility-administered medications for this visit. No Known Allergies     Review of Systems: A complete review of systems was obtained, negative except as described above.     Physical Exam:     Visit Vitals  /65 (BP 1 Location: Left arm, BP Patient Position: Sitting)   Pulse 72   Temp 96.9 °F (36.1 °C) (Oral)   Resp 16   Ht 5' 1\" (1.549 m)   Wt 179 lb 12.8 oz (81.6 kg)   SpO2 100%   BMI 33.97 kg/m²     ECOG PS: 0  General: No distress  Eyes: PERRLA, anicteric sclerae  HENT: Atraumatic, OP clear  Neck: Supple  Lymphatic: No cervical, supraclavicular adenopathy  Respiratory: CTAB, normal respiratory effort  CV: Normal rate, regular rhythm, no murmurs, no peripheral edema  GI: Soft, nontender, nondistended, no masses, no hepatomegaly, no splenomegaly; + BS  MS:  Digits without clubbing or cyanosis. Skin: No rashes, ecchymoses, or petechiae. Normal temperature, turgor, and texture. Psych: Alert, oriented, appropriate affect, normal judgment/insight    Results:   No results found for: WBC, HGB, HCT, PLT, MCV, ANEU, HGBPOC, HCTPOC, HGBEXT, HCTEXT, PLTEXT, HGBEXT, HCTEXT, PLTEXT  Lab Results   Component Value Date/Time    Sodium 139 02/18/2019 10:19 AM    Potassium 4.8 02/18/2019 10:19 AM    Chloride 102 02/18/2019 10:19 AM    CO2 31 02/18/2019 10:19 AM    Glucose 89 02/18/2019 10:19 AM    BUN 26 (H) 02/18/2019 10:19 AM    Creatinine 0.95 02/18/2019 10:19 AM    GFR est AA >60 02/18/2019 10:19 AM    GFR est non-AA >60 02/18/2019 10:19 AM    Calcium 9.5 02/18/2019 10:19 AM    Creatinine (POC) 1.0 02/01/2019 08:17 AM     No results found for: TBILI, ALT, SGOT, AP, TP, ALB, GLOB      Records reviewed and summarized above. Pathology report(s) reviewed above. Radiology report(s) reviewed above. Assessment/plan:   1. Right central invasive tubular cancer, gr 1, ER +, NV +, HER 2 negative, 1.7 cm, 0/6 LN:  Stage IA (both). postmenopausal    We explained to the patient that the goal of systemic adjuvant therapy is to improve the chances for cure and decrease the risk of relapse. We explained why a patient can have microscopic cancer spread now even though physical examination, laboratory studies and imaging studies are negative for cancer. We explained that the same treatments used now as adjuvant or preventive treatments rarely if ever are curative in women who develop metastases. We discussed the potential value of Mammaprint testing. The [place] 70-gene prognostic profile (Mammaprint®), one of the first gene expression array-based prognosticators, classifies tumors as low-risk or high-risk for breast cancer recurrence. The clinical validity of the 70-gene prognostic profile has been demonstrated in multiple studies.  Results from an international randomized trial, the Microarray in Node-Negative Disease May Avoid Chemotherapy (MINDACT) trial suggest that this genetic profile may identify subsets of patients who have a low likelihood of distant recurrence despite high-risk clinical features. In this trial, 1972 women, approximately [de-identified] percent of whom had lymph node-negative disease, underwent risk assessment by clinical criteria (using Adjuvant! Online) and by the 70-genetic profile. Patients with discordant clinical and genomic predictions were randomly assigned to receive or not receive adjuvant chemotherapy. Among patients in the intention-to-treat population who had a high clinical risk of recurrence but a low risk by [place] genetic profile, the five-year distant metastases-free survival rate was 95.9 percent with chemotherapy and 94.4 percent without chemotherapy (HR for distant metastasis or death 0.78, 95% CI 0.50-1.21). However, it should be noted that the MINDACT study was not powered to exclude a benefit of chemotherapy. In analysis of discordant groups in the intention-to-treat population, adjuvant chemotherapy was associated with improvement in the rate of distant metastasis-free survival of 2.8 and 2 percent, respectively, for the high clinical/low genomic and low clinical/high genomic risk groups, although these differences were also not statistically significant. Based on MINDACT, ASCO has suggested Mynor Epp is one of several assays that might be used to determine prognosis for those with high clinical risk, hormone receptor-positive, HER2-negative breast cancer and no or limited (one to three) involved lymph nodes to inform decisions regarding withholding chemotherapy. Low clinical and mammaprint risk, no indication for chemotherapy.     The risks and benefits of aromatase inhibitors (anastrozole, letrozole, and exemestane) were discussed in detail and the patient was informed of the following: Risks include the development of painful muscles and joints (arthralgia/myalgia) and bone loss. Muscle and joint pain can be severe but rarely result in any tissue damage; symptoms usually resolve in several weeks when the medication is stopped. Bone loss is common and a bone density test is recommended as a baseline and then yearly to every several years depending on initial results. The risk of fractures is increased by a few percent in patients taking these drugs, but careful monitoring of bone density and using bone protecting agents when indicated can minimize these risks. Unlike tamoxifen there is no increased risk of blood clots or endometrial cancer. AIs can cause or worsen vaginal dryness but women using these drugs should not use vaginal estrogen preparations for these symptoms. AIs can also cause or increase hot flashes. Any other symptoms should be reported. Follow-up after early breast cancer was discussed. I recommend follow-up as defined by the American Society of Clinical Oncology and [facility]. This includes a visit to a health care professional every 3-6 months for 3 years, then every 6-12 months for 2 years, and then yearly as well as mammograms yearly. After this discussion, she is agreeable to anastrozole 1 mg daily, rx in. To start 1 week after the completion of radiation. dexa ordered    2. Emotional well being:  She has excellent support and is coping well with her disease; on lexapro 20 mg daily    I appreciate the opportunity to participate in Ms. Maricruz Arce's care. Signed By: Luke Aleman MD      No orders of the defined types were placed in this encounter.

## 2019-03-28 ENCOUNTER — HOSPITAL ENCOUNTER (OUTPATIENT)
Dept: RADIATION THERAPY | Age: 59
Discharge: HOME | End: 2019-03-28

## 2019-06-18 ENCOUNTER — OFFICE VISIT (OUTPATIENT)
Dept: ONCOLOGY | Age: 59
End: 2019-06-18

## 2019-06-18 VITALS
SYSTOLIC BLOOD PRESSURE: 116 MMHG | DIASTOLIC BLOOD PRESSURE: 66 MMHG | HEART RATE: 65 BPM | BODY MASS INDEX: 34.02 KG/M2 | WEIGHT: 180.2 LBS | RESPIRATION RATE: 18 BRPM | HEIGHT: 61 IN | OXYGEN SATURATION: 97 % | TEMPERATURE: 97 F

## 2019-06-18 DIAGNOSIS — C50.911 INVASIVE DUCTAL CARCINOMA OF BREAST, FEMALE, RIGHT (HCC): Primary | ICD-10-CM

## 2019-06-18 DIAGNOSIS — Z78.0 POSTMENOPAUSAL: ICD-10-CM

## 2019-06-18 NOTE — PROGRESS NOTES
[facility] [place] [facility] [place] [address] [facility], [address] [facility]  Cassandra Maze: [phone]  F: [phone]      Reason for Visit:   Velia Ravi is a 62 y.o. female who is seen in consultation at the request of Dr. Geena Hollins for evaluation of systemic therapy for breast cancer    Consulting physician:  Dr. 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 Interval history:  In today for follow up. Complains of gr 1 fatigue, gr 1 sob.      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 Edilma Weeks    Traumatic neuroma, left neck 09/14/2010      Past Surgical History:   Procedure Laterality Date    HX BREAST BIOPSY Bilateral     benign stereo biopsies years ago    HX BREAST LUMPECTOMY Right 2/26/2019    RIGHT BREAST LUMPECTOMY WITH ULTRASOUND AND RIGHT BREAST SENTINEL NODE BIOPSY performed by Herman Hess MD at [address]    HX CHOLECYSTECTOMY      HX HERNIA REPAIR      Umbilical Hernia Repair    HX TUBAL LIGATION      [place] REMOVAL OF ANAL FISSURE      PREP FACE/ORAL PROST PALATAL LIFT        Social History     Tobacco Use    Smoking status: Current Every Day Smoker     Packs/day: 1.00     Years: 20.00     Pack years: 20.00    Smokeless tobacco: Never Used   Substance Use Topics    Alcohol use: Yes     Alcohol/week: 1.2 oz     Types: 2 Shots of liquor per week     Comment: rarely   2 drinks 2x/year      Family History   Problem Relation Age of Onset    Cancer Mother         Colon    Cancer Father         Lung    Cancer Brother         Brain    Cancer Paternal Aunt         Brain     Current Outpatient Medications   Medication Sig    tretinoin (RETIN-A) 0.1 % topical cream Apply  to affected area.  anastrozole (ARIMIDEX) 1 mg tablet Take 1 mg by mouth daily.  naproxen sod/diphenhydramine (ALEVE PM PO) Take 2 Tabs by mouth nightly.  amLODIPine (NORVASC) 5 mg tablet TK 1 T PO QD    atorvastatin (LIPITOR) 20 mg tablet TK 1 T PO QD    lisinopril-hydroCHLOROthiazide (PRINZIDE, ZESTORETIC) 20-12.5 mg per tablet TK 1 T PO QD    aspirin delayed-release 81 mg tablet Take 81 mg by mouth daily.  dextroamphetamine-amphetamine (ADDERALL) 20 mg tablet Take 20 mg by mouth two (2) times a day.  escitalopram oxalate (LEXAPRO) 20 mg tablet Take 20 mg by mouth daily.  omeprazole (PRILOSEC OTC) 20 mg tablet Take 20 mg by mouth daily. No current facility-administered medications for this visit. No Known Allergies     Review of Systems: A complete review of systems was obtained, negative except as described above.     Physical Exam:     Visit Vitals  /66   Pulse 65   Temp 97 °F (36.1 °C) (Temporal)   Resp 18   Ht 5' 1\" (1.549 m)   Wt 180 lb 3.2 oz (81.7 kg)   SpO2 97%   BMI 34.05 kg/m²     ECOG PS: 0  General: No distress  Eyes: PERRLA, anicteric sclerae  HENT: Atraumatic, OP clear  Neck: Supple  Lymphatic: No cervical, supraclavicular adenopathy  Respiratory: CTAB, normal respiratory effort  CV: Normal rate, regular rhythm, no murmurs, no peripheral edema  GI: Soft, nontender, nondistended, no masses, no hepatomegaly, no splenomegaly; + BS  MS:  Digits without clubbing or cyanosis. Skin: No rashes, ecchymoses, or petechiae. Normal temperature, turgor, and texture. Psych: Alert, oriented, appropriate affect, normal judgment/insight    Results:   No results found for: WBC, HGB, HCT, PLT, MCV, ANEU, HGBPOC, HCTPOC, HGBEXT, HCTEXT, PLTEXT, HGBEXT, HCTEXT, PLTEXT  Lab Results   Component Value Date/Time    Sodium 139 02/18/2019 10:19 AM    Potassium 4.8 02/18/2019 10:19 AM    Chloride 102 02/18/2019 10:19 AM    CO2 31 02/18/2019 10:19 AM    Glucose 89 02/18/2019 10:19 AM    BUN 26 (H) 02/18/2019 10:19 AM    Creatinine 0.95 02/18/2019 10:19 AM    GFR est AA >60 02/18/2019 10:19 AM    GFR est non-AA >60 02/18/2019 10:19 AM    Calcium 9.5 02/18/2019 10:19 AM    Creatinine (POC) 1.0 02/01/2019 08:17 AM     No results found for: TBILI, ALT, SGOT, AP, TP, ALB, GLOB      Records reviewed and summarized above. Pathology report(s) reviewed above. Radiology report(s) reviewed above. Assessment/plan:   1. Right central invasive tubular cancer, gr 1, ER +, [place] +, HER 2 negative, 1.7 cm, 0/6 LN:  Stage IA (both). postmenopausal    We explained to the patient that the goal of systemic adjuvant therapy is to improve the chances for cure and decrease the risk of relapse. We explained why a patient can have microscopic cancer spread now even though physical examination, laboratory studies and imaging studies are negative for cancer. We explained that the same treatments used now as adjuvant or preventive treatments rarely if ever are curative in women who develop metastases. Low clinical and mammaprint risk, no indication for chemotherapy. Continue anastrozole 1 mg daily, rx in. Completed XRT 5/7/19 and started anastrozole on 5/27/19, tolerating well. Dexa previously ordered, she will schedule this soon.     Next follow up with Dr. Jesus Rhodes [id number]/7021.      2. Emotional well being:  She has excellent support and is coping well with her disease; on lexapro 20 mg daily    3. Fatigue: Mild but stable. May be due to xrt. She continues to work but gets tired easily. Will monitor. This patient was seen in conjunction with Shyla Cruz NP    > 15 min were spent with this patient with > 50% of that time spent in face to face counseling      I appreciate the opportunity to participate in Ms. Maricruz Arce's care. Signed By: Lindsey Cornelius MD      No orders of the defined types were placed in this encounter.

## 2019-07-02 ENCOUNTER — PATIENT OUTREACH (OUTPATIENT)
Dept: ONCOLOGY | Age: 59
End: 2019-07-02

## 2019-07-02 NOTE — PROGRESS NOTES
This Survivorship Care Plan is a cancer treatment summary and follow up plan and is provided to you to keep with your health plan records and to share with your primary care provider or any of your doctors and nurses. This summary is a brief record of major aspects of your cancer treatment and not a detailed or comprehensive record of your care. You should review this with your cancer provider. General Information   Patient Name: Criss Kehr   Patient :1960   Health Care Providers (Including Names, Institution)   Primary Care Savita Solano MD   Surgeon:  Gilda Godfrey. MD [place], [place] [address]     Radiation Oncologist:  MD [address] [facility]     Medical Oncologist:   Isamar Nelson Dr at [facility]     Other Providers:   Treatment Summary   Diagnosis   Cancer Type/Histology Subtype:  right breast cancer invasive ductal carcinoma  Mammaprint low risk    Receptors:     Estrogen Positive  Progesterone Positive  HER2 Negative   Diagnosis Date (year):     Stage:   IA  (T1c, N0, M0)   Treatment Completed   Surgery: Yes Surgery Date(s) (year):     Surgical procedure/findings: RIGHT BREAST LUMPECTOMY WITH ULTRASOUND AND RIGHT BREAST SENTINEL NODE BIOPSY    Lymph node removal: sentinel node biopsy      Radiation: Yes   Body area treated:  Right breast End Date (year):     Systemic Therapy (chemotherapy, hormonal therapy, other): No None     Treatment Ongoing   Additional treatment name Planned duration Possible Side effects   Aromatase Inhibitors (anastrozole, exemestane and letrozole)        Hot flashes joint/muscle aches vaginal dryness and bone loss (common) hair thinning (rare)  Other rare side effects may occur. Persistent symptoms or side effects at completion of treatment:    Fatigue:  Yes  relieved by rest Menopausal symptoms: No  Numbness:No                                                               Pain:No  Psychosocial/Depression: No                                               Familial Cancer Risk Assessment   Breast and or ovarian cancer in 1st or 2nd degree relatives: No     Received Genetic counseling: No   Genetic testing:  No        Follow-up Care Plan   Your follow-up care plan is design to inform you and primary care providers regarding the recommended and required follow-up, cancer screening and routine health maintenance that is needed to maintain optimal health. Possible late- and long-term effects that someone with this type of cancer and treatment may experience:  Weakening of the heart presenting as shortness of breath and swelling of legs (rare < 5%); and bones become weak and at risk for fracture (osteoporosis). It is important to remember that these symptoms can be due to other causes like diabetes or with normal aging. If these or any other new symptoms occur bring these to attention of your health care provider. These symptoms should be brought to the attention of your provider:   1. Anything that represents a brand new symptom;  2. Anything that represents a persistent symptom;  3. Anything you are worried about that might be related to the cancer coming back. Please continue to see your primary care provider for all general health care recommended for a woman your age such as routine immunizations, and routine non-breast cancer screening like colonoscopy or bone density exams. Consult with your health care provider about prevention and screening for bone loss using bone density tests.      Schedule for Clinical Visits   Coordinating Provider When/How often   Dr. Isabel Ha 12/18/19 and as directed by him   Dr. Jenniffer Lord Due October 2285 and as directed by him   Dr. Rachele Lafleur As directed by her   Cancer Surveillance Or Other Recommended Tests    National Comprehensive Cancer Network (NCCN) guidelines recommend patients have a  history and physical exam 1-4 times a year as clinically appropriate for 5 years, then annually. Coordinating Provider TEST How often   Dr. Jonny Maldonado Annually    MRI breast As indicated by provider   PCP/Gyn Pap/pelvic exam As indicated by provider   PCP/GI Colonoscopy As indicated by provider   Dr. Kerwin Mcmanus Every 2 years if on an aromatase inhibitor or as indicated by your provider   Breast cancer survivors may experience issues with the areas listed below. If you have any concerns in these or other areas, please speak with your doctors or nurses to find out how you can get help with them. Anxiety or depression   Insurance     Sexual Functioning  Emotional and mental health  Memory or concentration loss         Stopping Smoking   Fatigue    Parenting     Weight changes   Fertility    Physical functioning      Financial advice or assistance  School/work         A number of lifestyle/behaviors can affect your ongoing health, including the risk for the cancer coming back or developing another cancer. Discuss these recommendations with your doctor or nurse:    Alcohol use    Physical activity                Diet     Sun screen use      Management of my medications  Tobacco use/cessation       Management of my other illnesses  Weight management (loss/gain)                                Resources you may be interested in:      www.cancer. net                    Offers educational support and guidelines for treatment and follow up care     Other:  Cancercare. org                    Cancer Care offers financial, emotional, and educational support    www.nccn.org/patients/                   Offers educational support and guidelines for treatment and follow up care     Other comments:   Prepared by:   Tejal Jasso RN , OCN                                                                                      Delivered on: 7/9/19

## 2019-07-02 NOTE — LETTER
7/9/2019 7:19 AM 
 
Ms. Alexsander Lyleel [facility] And [address] [address] Dear Alexsander Mariaelena: The [address] [place] [address] Team is committed to your health and well-being. The Cancer Survivorship Program offers support to patients who have completed or are nearing completion of their cancer treatment. The program provides patients with their plan of care and resources to help support patients as they transition into the survivorship phase of their care. A survivorship care plan is a record of your cancer and treatment history, as well as any checkups or follow-up tests you need in the future. It will also include a list of possible long-term effects of your treatments and ideas for staying healthy. Your plan will give you recommendations of when you need to get follow-up tests, and which doctors are responsible for your care. Keep your plan and refer to it so you can be sure you get the tests you need. Your plan includes important information about your cancer and treatment, which helps you and your doctors understand each other. Bring it with you whenever you go to the doctor. Please review your enclosed survivorship care plan and contact me so I may answer any questions and provide additional resources. Lalito Pérez RN, OCN Cancer Survivorship  [employer] [phone] [email or web address]

## 2019-12-18 ENCOUNTER — OFFICE VISIT (OUTPATIENT)
Dept: ONCOLOGY | Age: 59
End: 2019-12-18

## 2019-12-18 VITALS
HEART RATE: 82 BPM | WEIGHT: 176.8 LBS | RESPIRATION RATE: 16 BRPM | DIASTOLIC BLOOD PRESSURE: 74 MMHG | HEIGHT: 61 IN | SYSTOLIC BLOOD PRESSURE: 136 MMHG | TEMPERATURE: 96.7 F | OXYGEN SATURATION: 96 % | BODY MASS INDEX: 33.38 KG/M2

## 2019-12-18 DIAGNOSIS — Z78.0 POSTMENOPAUSAL: ICD-10-CM

## 2019-12-18 DIAGNOSIS — C50.911 INVASIVE DUCTAL CARCINOMA OF BREAST, FEMALE, RIGHT (HCC): Primary | ICD-10-CM

## 2019-12-18 NOTE — PROGRESS NOTES
[facility] [place] at [place] [address] [facility], [address] [place]: [phone]  F: [phone]      Reason for Visit:   Mayelin Russell is a 61 y.o. female who is seen in consultation at the request of Dr. Agustín Khan for evaluation of systemic therapy for breast cancer    Consulting physician:  Dr. 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 Interval history:  In today for follow up. Complains of gr 2 hot flashes, gr 1 cough, gr 1 sob.     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 Kendra Dwyer    Traumatic neuroma, left neck 09/14/2010      Past Surgical History:   Procedure Laterality Date    HX BREAST BIOPSY Bilateral     benign stereo biopsies years ago    HX BREAST LUMPECTOMY Right 2/26/2019    RIGHT BREAST LUMPECTOMY WITH ULTRASOUND AND RIGHT BREAST SENTINEL NODE BIOPSY performed by Gurmeet Rojas MD at [facility]    HX CHOLECYSTECTOMY      HX HERNIA REPAIR      Umbilical Hernia Repair    HX TUBAL LIGATION      [place] REMOVAL OF ANAL FISSURE      PREP FACE/ORAL PROST PALATAL LIFT        Social History     Tobacco Use    Smoking status: Current Every Day Smoker     Packs/day: 1.00     Years: 20.00     Pack years: 20.00    Smokeless tobacco: Never Used   Substance Use Topics    Alcohol use: Yes     Alcohol/week: 2.0 standard drinks     Types: 2 Shots of liquor per week     Comment: rarely   2 drinks 2x/year      Family History   Problem Relation Age of Onset    Cancer Mother         Colon    Cancer Father         Lung    Cancer Brother         Brain    Cancer Paternal Aunt         Brain     Current Outpatient Medications   Medication Sig    tretinoin (RETIN-A) 0.1 % topical cream Apply  to affected area.  anastrozole (ARIMIDEX) 1 mg tablet Take 1 mg by mouth daily.  naproxen sod/diphenhydramine (ALEVE PM PO) Take 2 Tabs by mouth nightly.  amLODIPine (NORVASC) 5 mg tablet TK 1 T PO QD    atorvastatin (LIPITOR) 20 mg tablet TK 1 T PO QD    lisinopril-hydroCHLOROthiazide (PRINZIDE, ZESTORETIC) 20-12.5 mg per tablet TK 1 T PO QD    aspirin delayed-release 81 mg tablet Take 81 mg by mouth daily.  dextroamphetamine-amphetamine (ADDERALL) 20 mg tablet Take 20 mg by mouth two (2) times a day.  escitalopram oxalate (LEXAPRO) 20 mg tablet Take 20 mg by mouth daily.  omeprazole (PRILOSEC OTC) 20 mg tablet Take 20 mg by mouth daily. No current facility-administered medications for this visit. No Known Allergies     Review of Systems: A complete review of systems was obtained, negative except as described above.     Physical Exam:     Visit Vitals  /74 (BP 1 Location: Right arm, BP Patient Position: Sitting)   Pulse 82   Temp 96.7 °F (35.9 °C) (Temporal)   Resp 16   Ht 5' 1\" (1.549 m)   Wt 176 lb 12.8 oz (80.2 kg)   SpO2 96%   BMI 33.41 kg/m²     ECOG PS: 0  General: No distress  Eyes: PERRLA, anicteric sclerae  HENT: Atraumatic, OP clear  Neck: Supple  Lymphatic: No cervical, supraclavicular adenopathy  Respiratory: CTAB, normal respiratory effort  CV: Normal rate, regular rhythm, no murmurs, no peripheral edema  GI: Soft, nontender, nondistended, no masses, no hepatomegaly, no splenomegaly; + BS  MS:  Digits without clubbing or cyanosis. Skin: No rashes, ecchymoses, or petechiae. Normal temperature, turgor, and texture. Psych: Alert, oriented, appropriate affect, normal judgment/insight    Results:   No results found for: WBC, HGB, HCT, PLT, MCV, ANEU, HGBPOC, HCTPOC, HGBEXT, HCTEXT, PLTEXT, HGBEXT, HCTEXT, PLTEXT  Lab Results   Component Value Date/Time    Sodium 139 02/18/2019 10:19 AM    Potassium 4.8 02/18/2019 10:19 AM    Chloride 102 02/18/2019 10:19 AM    CO2 31 02/18/2019 10:19 AM    Glucose 89 02/18/2019 10:19 AM    BUN 26 (H) 02/18/2019 10:19 AM    Creatinine 0.95 02/18/2019 10:19 AM    GFR est AA >60 02/18/2019 10:19 AM    GFR est non-AA >60 02/18/2019 10:19 AM    Calcium 9.5 02/18/2019 10:19 AM    Creatinine (POC) 1.0 02/01/2019 08:17 AM     No results found for: TBILI, ALT, SGOT, AP, TP, ALB, GLOB      Records reviewed and summarized above. Pathology report(s) reviewed above. Radiology report(s) reviewed above. Assessment/plan:   1. Right central invasive tubular cancer, gr 1, ER +, [place] +, HER 2 negative, 1.7 cm, 0/6 LN:  Stage IA (both). postmenopausal    We explained to the patient that the goal of systemic adjuvant therapy is to improve the chances for cure and decrease the risk of relapse. We explained why a patient can have microscopic cancer spread now even though physical examination, laboratory studies and imaging studies are negative for cancer. We explained that the same treatments used now as adjuvant or preventive treatments rarely if ever are curative in women who develop metastases. Low clinical and mammaprint risk, no indication for chemotherapy. Continue anastrozole 1 mg daily, rx in. Completed XRT 5/7/19 and started anastrozole on 5/27/19, tolerating well.      Dexa previously ordered, has not been scheduled, encouraged patient to schedule this soon.    Next follow up with Dr. Essence Lawton was for 97/6795 but was missed, she will make follow up soon. She will also schedule follow up with Dr. Swapnil Landon. Mammogram due in Feb 2020    2. Emotional well being:  She has excellent support and is coping well with her disease; on lexapro 20 mg daily    3. Fatigue: Stable. Continues to work. Will monitor. This patient was seen in conjunction with Keturah Null NP    > 15 min were spent with this patient with > 50% of that time spent in face to face counseling      I appreciate the opportunity to participate in Ms. Maricruz Arce's care. Signed By: Ramandeep Calle MD      No orders of the defined types were placed in this encounter.

## 2019-12-18 NOTE — PROGRESS NOTES
Please sent in Rx for either Trulicity or Ozempic to [facility].     Thank you.    Tristan Cole is a 61 y.o. female Follow up for the Evaluation for Breast Cancer. 1. Have you been to the ER, urgent care clinic since your last visit? Hospitalized since your last visit? No    2. Have you seen or consulted any other health care providers outside of the [address] [place] [address] since your last visit? Include any pap smears or colon screening.  No

## 2020-03-30 DIAGNOSIS — C50.911 INVASIVE DUCTAL CARCINOMA OF BREAST, FEMALE, RIGHT (HCC): ICD-10-CM

## 2020-03-30 RX ORDER — ANASTROZOLE 1 MG/1
TABLET ORAL
Qty: 90 TAB | Refills: 3 | Status: SHIPPED | OUTPATIENT
Start: 2020-03-30 | End: 2021-02-08 | Stop reason: SDUPTHER

## 2020-06-09 DIAGNOSIS — Z85.3 HISTORY OF RIGHT BREAST CANCER: Primary | ICD-10-CM

## 2020-06-09 NOTE — PROGRESS NOTES
ENRIQUE needs order for BDM. After looking at the chart, it looks like she is overdue. Placed order for 3DBDM to be done whenever the patient wants to schedule.

## 2020-06-26 ENCOUNTER — HOSPITAL ENCOUNTER (OUTPATIENT)
Dept: MAMMOGRAPHY | Age: 60
Discharge: HOME | End: 2020-06-26
Attending: SURGERY
Payer: COMMERCIAL

## 2020-06-26 ENCOUNTER — HOSPITAL ENCOUNTER (OUTPATIENT)
Dept: MAMMOGRAPHY | Age: 60
Discharge: HOME | End: 2020-06-26
Attending: FAMILY MEDICINE
Payer: COMMERCIAL

## 2020-06-26 DIAGNOSIS — Z13.820 SCREENING FOR OSTEOPOROSIS: ICD-10-CM

## 2020-06-26 DIAGNOSIS — Z85.3 HISTORY OF RIGHT BREAST CANCER: ICD-10-CM

## 2020-06-26 PROCEDURE — 77062 BREAST TOMOSYNTHESIS BI: CPT

## 2020-06-26 PROCEDURE — 77080 DXA BONE DENSITY AXIAL: CPT

## 2020-07-16 ENCOUNTER — TELEPHONE (OUTPATIENT)
Dept: ONCOLOGY | Age: 60
End: 2020-07-16

## 2020-07-22 ENCOUNTER — VIRTUAL VISIT (OUTPATIENT)
Dept: ONCOLOGY | Age: 60
End: 2020-07-22

## 2020-07-22 DIAGNOSIS — C50.911 INVASIVE DUCTAL CARCINOMA OF BREAST, FEMALE, RIGHT (HCC): Primary | ICD-10-CM

## 2020-07-22 RX ORDER — GABAPENTIN 300 MG/1
300 CAPSULE ORAL
Qty: 30 CAP | Refills: 3 | Status: SHIPPED | OUTPATIENT
Start: 2020-07-22 | End: 2021-11-12

## 2020-07-22 NOTE — PROGRESS NOTES
[facility] [place] [facility] [place] [address] [facility], [address] [facility]  Tejal Coffer: [phone]  F: [phone]        Reason for Visit:   Odell Sykes is a 61 y.o. female who is seen by synchronous (real-time) audio-video technology for follow up of breast cancer    Breast surgeon:  Dr. Cade Reyes physician:  Dr. 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 Interval history:  Complains of gr 2 hot flashes, gr 1 cough, gr 1 sob.     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 Nicole Isaacs    Traumatic neuroma, left neck 09/14/2010      Past Surgical History:   Procedure Laterality Date    HX BREAST BIOPSY Bilateral     benign stereo biopsies years ago    HX BREAST LUMPECTOMY Right 2/26/2019    RIGHT BREAST LUMPECTOMY WITH ULTRASOUND AND RIGHT BREAST SENTINEL NODE BIOPSY performed by Neil Mitchell MD at [place] [address] HX CHOLECYSTECTOMY      HX HERNIA REPAIR      Umbilical Hernia Repair    HX TUBAL LIGATION      VA REMOVAL OF ANAL FISSURE      PREP FACE/ORAL PROST PALATAL LIFT        Social History     Tobacco Use    Smoking status: Current Every Day Smoker     Packs/day: 1.00     Years: 20.00     Pack years: 20.00    Smokeless tobacco: Never Used   Substance Use Topics    Alcohol use: Yes     Alcohol/week: 2.0 standard drinks     Types: 2 Shots of liquor per week     Comment: rarely   2 drinks 2x/year      Family History   Problem Relation Age of Onset    Cancer Mother         Colon    Cancer Father         Lung    Cancer Brother         Brain    Cancer Paternal Aunt         Brain     Current Outpatient Medications   Medication Sig    anastrozole (ARIMIDEX) 1 mg tablet TAKE 1 TABLET BY MOUTH DAILY    tretinoin (RETIN-A) 0.1 % topical cream Apply  to affected area.  naproxen sod/diphenhydramine (ALEVE PM PO) Take 2 Tabs by mouth nightly.  amLODIPine (NORVASC) 5 mg tablet TK 1 T PO QD    atorvastatin (LIPITOR) 20 mg tablet TK 1 T PO QD    lisinopril-hydroCHLOROthiazide (PRINZIDE, ZESTORETIC) 20-12.5 mg per tablet TK 1 T PO QD    aspirin delayed-release 81 mg tablet Take 81 mg by mouth daily.  omeprazole (PRILOSEC OTC) 20 mg tablet Take 20 mg by mouth daily.  dextroamphetamine-amphetamine (ADDERALL) 20 mg tablet Take 20 mg by mouth two (2) times a day.  escitalopram oxalate (LEXAPRO) 20 mg tablet Take 20 mg by mouth daily. No current facility-administered medications for this visit. No Known Allergies     Review of Systems: A complete review of systems was obtained, negative except as described above. Physical Exam:     There were no vitals taken for this visit.   ECOG PS: 0  General: alert, cooperative, no distress   Mental  status: normal mood, behavior, speech, dress, motor activity, and thought processes, able to follow commands   HENT: NCAT   Neck: no visualized mass   Resp: no respiratory distress   Neuro: no gross deficits   Skin: no discoloration or lesions of concern on visible areas   Psychiatric: normal affect, consistent with stated mood, no evidence of hallucinations Due to this being a TeleHealth evaluation (During ZPKMV-73 public health emergency), many elements of the physical examination are unable to be assessed. Evaluation of the following organ systems was limited: Vitals/Constitutional/EENT/Resp/CV/GI//MS/Neuro/Skin/Heme-Lymph-Imm. Results:   No results found for: WBC, HGB, HCT, PLT, MCV, ANEU, HGBPOC, HCTPOC, HGBEXT, HCTEXT, PLTEXT, HGBEXT, HCTEXT, PLTEXT  Lab Results   Component Value Date/Time    Sodium 139 02/18/2019 10:19 AM    Potassium 4.8 02/18/2019 10:19 AM    Chloride 102 02/18/2019 10:19 AM    CO2 31 02/18/2019 10:19 AM    Glucose 89 02/18/2019 10:19 AM    BUN 26 (H) 02/18/2019 10:19 AM    Creatinine 0.95 02/18/2019 10:19 AM    GFR est AA >60 02/18/2019 10:19 AM    GFR est non-AA >60 02/18/2019 10:19 AM    Calcium 9.5 02/18/2019 10:19 AM    Creatinine (POC) 1.0 02/01/2019 08:17 AM     No results found for: TBILI, ALT, AP, TP, ALB, GLOB  6/26/20 bilat 3D mammogram  Negative    6/26/20 dexa      Findings:        Femoral Neck Left:  Bone mineral density (gm/cm2): 0.879  % of peak bone mass: 85  % for age matched controls: 95  T-score: -1.1  Z-score: -0.3     Femoral Neck Right:  Bone mineral density (gm/cm2): 0.894  % of peak bone mass: 86  % for age matched controls:  80  T-score: -1.0  Z-score: -0.2     Total Hip Left:  Bone mineral density (gm/cm2): 1.047  % of peak bone mass: 104  % for age matched controls: 110  T-score: 0.3  Z-score: 0.8     Total Hip Right:  Bone mineral density (gm/cm2): 1.052  % of peak bone mass: 104  % for age matched controls:  111  T-score: 0.3  Z-score: 0.8     Lumbar Spine: L1-L4  Bone mineral density (gm/cm2): 1.167  % of peak bone mass: 98  % for age matched controls: 103  T-score: -0.2  Z-score: 0.3        IMPRESSION  Impression:      This patient is osteopenic using the World Health Organization criteria  10 year probability of major osteoporotic fracture: 6.6%  10 year probability of hip fracture: 0.7%    Records reviewed and summarized above. Pathology report(s) reviewed above. Radiology report(s) reviewed above. Assessment/plan:   1. 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 postmenopausal    We explained to the patient that the goal of systemic adjuvant therapy is to improve the chances for cure and decrease the risk of relapse. We explained why a patient can have microscopic cancer spread now even though physical examination, laboratory studies and imaging studies are negative for cancer. We explained that the same treatments used now as adjuvant or preventive treatments rarely if ever are curative in women who develop metastases. Low clinical and mammaprint risk, no indication for chemotherapy. Continue anastrozole 1 mg daily. Completed XRT 5/7/19 and started anastrozole on 5/27/19, tolerating well. Mammogram due 6/2021    2. Emotional well being:  She has excellent support and is coping well with her disease; on lexapro 20 mg daily    3. Hot flashes:  Mild, on lexapro; will rx gabapentin 300 mg qhs, #30 pills, 3 refills,  reviewed    4. Osteopenia:  New; Recommend 2000 int units of vit D3 daily    I was in the office while conducting this encounter. The patient was at her home. Consent:  She and/or her healthcare decision maker is aware that this patient-initiated Telehealth encounter is a billable service, with coverage as determined by her insurance carrier.  She is aware that she may receive a bill and has provided verbal consent to proceed: Yes    Pursuant to the emergency declaration under the [facility] the [facility] 1135 waiver authority and the [employer] Act, this Virtual  Visit was conducted, with patient's (and/or legal guardian's) consent, to reduce the patient's risk of exposure to COVID-19 and provide necessary medical care.     Services were provided through a video synchronous discussion virtually to substitute for in-person visit. I appreciate the opportunity to participate in Ms. Maricruz Arce's care. Signed By: Samia Fish MD      No orders of the defined types were placed in this encounter.

## 2020-07-27 ENCOUNTER — OFFICE VISIT (OUTPATIENT)
Dept: SURGERY | Age: 60
End: 2020-07-27

## 2020-07-27 VITALS
TEMPERATURE: 97.6 F | SYSTOLIC BLOOD PRESSURE: 106 MMHG | WEIGHT: 185 LBS | HEIGHT: 61 IN | DIASTOLIC BLOOD PRESSURE: 59 MMHG | BODY MASS INDEX: 34.93 KG/M2 | HEART RATE: 68 BPM

## 2020-07-27 DIAGNOSIS — C50.911 INVASIVE DUCTAL CARCINOMA OF BREAST, FEMALE, RIGHT (HCC): ICD-10-CM

## 2020-07-27 NOTE — PROGRESS NOTES
HISTORY OF PRESENT ILLNESS Odell Sykes is a 61 y.o. female. HPI  ESTABLISHED patient here for follow-up of RIGHT breast cancer, S/P lumpectomy last winter. She is doing great, tolerating the anastrozole, but having considerable problems with hot flashes. Dr. Carlos Lock just started her on gabapentin to see if this helps with this. Having no breast complaints. Mammogram done recently was normal.   
01/18/19: RIGHT breast biopsies. PATH as follows: - Site A, mass: IDC with tubular features, 0.4 cm in greatest dimension in a single core, grade 1, ER+(100%)/LA+(95%)/HER2-. Clinical stage 1. 
 - Site B, calcifications: Fibrocystic changes with usual ductal hyperplasia, columnar cell hyperplasia, apocrine cysts and calcifications, negative for in situ or invasive carcinoma. Jan/Feb 2019: MammaPrint, insufficient amount for testing. 
 
02/26/19: RIGHT lumpectomy and SLNBx. 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 DCIS present, involves over 50% of the tumor, negative margins. 0/6 LNs involved. Pathologic stage: pT1c, pN0. Feb/Mar 2019: MammaPrint, low risk lumimal type A, +0.411. 
 
05/07/19: Completed XRT. 05/27/19 - : Anastrozole. (Dr. Carlos Lock) [facility] Results (most recent): 
Results from Hospital Encounter encounter on 06/26/20 [facility] 3D AL W MAMMO BI DX INCL CAD Narrative INDICATION: . Personal history of malignant neoplasm of breast.  
COMPARISON: Mammogram(s), most recent, 1/18/2019. Ramiro Mcgee COMPOSITION:  
There are scattered fibroglandular densities. . 
TECHNIQUE:  
Standard 2D, CC and MLO and spot magnification view views of the each breast as 
well as whole breast ML view of the left breast breast were performed with 
digital technique and subjected to computer-aided detection. Tomosynthesis of 
each breast was performed in CC and MLO projections. Ramiro Mcgee  
FINDINGS:   
Postsurgical and posttreatment changes in the right breast. 
 No significant change in the appearance of calcifications in either breast.  
No new visualized mass, suspicious microcalcification or architectural 
distortion. .  
 Impression IMPRESSION:    
1. BI-RADS Category 2 - Benign findings. . 
RECOMMENDATION:   
Mammogram in 1 year. [facility] Physical Exam 
 
ASSESSMENT and PLAN 
{ASSESSMENT/PLAN:[id number]}

## 2020-07-27 NOTE — PROGRESS NOTES
HISTORY OF PRESENT ILLNESS  Shantell Umanzor is a 61 y.o. female. HPI  ESTABLISHED patient here for follow-up of RIGHT breast cancer, S/P lumpectomy last winter. She is doing great, tolerating the anastrozole, but having considerable problems with hot flashes. Dr. Aurelia Galeas just started her on gabapentin to see if this helps with this. Having no breast complaints. Mammogram done recently was normal.      01/18/19: RIGHT breast biopsies. PATH as follows:              - Site A, mass: IDC with tubular features, 0.4 cm in greatest dimension in a single core, grade 1, ER+(100%)/NM+(95%)/HER2-. Clinical stage 1.              - Site B, calcifications: Fibrocystic changes with usual ductal hyperplasia, columnar cell hyperplasia, apocrine cysts and calcifications, negative for in situ or invasive carcinoma.     Jan/FebFeb 2019: MammaPrint, insufficient amount for testing.     02/26/19: RIGHT lumpectomy and SLNBx. 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 DCIS present, involves over 50% of the tumor, negative margins. 0/6 LNs involved. Pathologic stage: pT1c, pN0.     Feb/Mar 2019: MammaPrint, low risk lumimal type A, +0.411.     05/07/19: Completed XRT.     05/27/19 - : Anastrozole. (Dr. Aurelia Galeas)    [facility] Results (most recent):       Results from [place] encounter on 06/26/20   [facility] 3D AL W MAMMO BI DX INCL CAD     Narrative INDICATION: . Personal history of malignant neoplasm of breast.   COMPARISON: Mammogram(s), most recent, 1/18/2019. [facility] COMPOSITION:   There are scattered fibroglandular densities. .  TECHNIQUE:   Standard 2D, CC and MLO and spot magnification view views of the each breast as  well as whole breast ML view of the left breast breast were performed with  digital technique and subjected to computer-aided detection. Tomosynthesis of  each breast was performed in CC and MLO projections. [facility]   FINDINGS:    Postsurgical and posttreatment changes in the right breast.  No significant change in the appearance of calcifications in either breast.   No new visualized mass, suspicious microcalcification or architectural  distortion. .     Impression IMPRESSION:     1. BI-RADS Category 2 - Benign findings. .  RECOMMENDATION:    Mammogram in 1 year. 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 Tamra Gaona    Traumatic neuroma, left neck 09/14/2010       Past Surgical History:   Procedure Laterality Date    HX BREAST BIOPSY Bilateral     benign stereo biopsies years ago    HX BREAST LUMPECTOMY Right 2/26/2019    RIGHT BREAST LUMPECTOMY WITH ULTRASOUND AND RIGHT BREAST SENTINEL NODE BIOPSY performed by Tim[facility] Jernigan [facility]    HX CHOLECYSTECTOMY      HX HERNIA REPAIR      Umbilical Hernia Repair    HX TUBAL LIGATION      [facility] REMOVAL OF ANAL FISSURE      PREP FACE/ORAL PROST PALATAL LIFT         Social History     Socioeconomic History    Marital status:      Spouse name: Not on file    Number of children: Not on file    Years of education: Not on file    Highest education level: Not on file   Occupational History    Not on file   Social Needs    Financial resource strain: Not on file    Food insecurity     Worry: Not on file     Inability: Not on file    Transportation needs     Medical: Not on file     Non-medical: Not on file   Tobacco Use    Smoking status: Current Every Day Smoker     Packs/day: 1.00     Years: 20.00     Pack years: 20.00    Smokeless tobacco: Never Used   Substance and Sexual Activity    Alcohol use:  Yes     Alcohol/week: 2.0 standard drinks     Types: 2 Shots of liquor per week     Comment: rarely   2 drinks 2x/year    Drug use: No    Sexual activity: Yes   Lifestyle    Physical activity     Days per week: Not on file     Minutes per session: Not on file    Stress: Not on file   Relationships    Social connections     Talks on phone: Not on file     Gets together: Not on file     Attends Yarsanism service: Not on file     Active member of club or organization: Not on file     Attends meetings of clubs or organizations: Not on file     Relationship status: Not on file    Intimate partner violence     Fear of current or ex partner: Not on file     Emotionally abused: Not on file     Physically abused: Not on file     Forced sexual activity: Not on file   Other Topics Concern    Not on file   Social History Narrative    Not on file       Current Outpatient Medications on File Prior to Visit   Medication Sig Dispense Refill    gabapentin (NEURONTIN) 300 mg capsule Take 1 Cap by mouth nightly. Max Daily Amount: 300 mg. 30 Cap 3    anastrozole (ARIMIDEX) 1 mg tablet TAKE 1 TABLET BY MOUTH DAILY 90 Tab 3    tretinoin (RETIN-A) 0.1 % topical cream Apply  to affected area. 3    naproxen sod/diphenhydramine (ALEVE PM PO) Take 2 Tabs by mouth nightly.  amLODIPine (NORVASC) 5 mg tablet TK 1 T PO QD  6    atorvastatin (LIPITOR) 20 mg tablet TK 1 T PO QD  2    lisinopril-hydroCHLOROthiazide (PRINZIDE, ZESTORETIC) 20-12.5 mg per tablet TK 1 T PO QD  3    aspirin delayed-release 81 mg tablet Take 81 mg by mouth daily.  escitalopram oxalate (LEXAPRO) 20 mg tablet Take 20 mg by mouth daily.  omeprazole (PRILOSEC OTC) 20 mg tablet Take 20 mg by mouth daily.  dextroamphetamine-amphetamine (ADDERALL) 20 mg tablet Take 20 mg by mouth two (2) times a day. No current facility-administered medications on file prior to visit. No Known Allergies    OB History    No obstetric history on file.       Obstetric Comments   Menarche 15, LMP 2016, # of children 2, age of 4st delivery 34, Hysterectomy/oophorectomy No/No/, Breast bx Yes, numerous, history of breast feeding No, BCP No, Hormone therapy No               ROS    Physical Exam  Exam conducted with a chaperone present. Cardiovascular:      Rate and Rhythm: Normal rate and regular rhythm. Heart sounds: Normal heart sounds. Pulmonary:      Breath sounds: Normal breath sounds. Chest:      Breasts: Breasts are symmetrical.         Right: Normal. No swelling, bleeding, inverted nipple, mass, nipple discharge, skin change or tenderness. Left: Normal. No swelling, bleeding, inverted nipple, mass, nipple discharge, skin change or tenderness. Lymphadenopathy:      Cervical:      Right cervical: No superficial, deep or posterior cervical adenopathy. Left cervical: No superficial, deep or posterior cervical adenopathy. Upper Body:      Right upper body: No supraclavicular or axillary adenopathy. Left upper body: No supraclavicular or axillary adenopathy. ASSESSMENT and PLAN    ICD-10-CM ICD-9-CM    1. Invasive ductal carcinoma of breast, female, right ([facility]  C50.911 174.9       Patient presents to f/u on RIGHT breast IDC, and is doing well overall. Well healed incision s/p lumpectomy, no evidence of local recurrence. Reviewed plan for gabapentin for hot flashes. F/U in 1 year with Petra Cisneros NP, after [facility] diagnostic mammogram. This plan was reviewed with the patient and patient agrees. All questions were answered.     Written by Loni Burt, as dictated by Dr. Jyoti Pang MD.

## 2020-07-27 NOTE — PATIENT INSTRUCTIONS

## 2021-02-05 ENCOUNTER — TELEPHONE (OUTPATIENT)
Dept: ONCOLOGY | Age: 61
End: 2021-02-05

## 2021-02-08 ENCOUNTER — OFFICE VISIT (OUTPATIENT)
Dept: ONCOLOGY | Age: 61
End: 2021-02-08
Payer: COMMERCIAL

## 2021-02-08 VITALS
RESPIRATION RATE: 18 BRPM | WEIGHT: 178 LBS | TEMPERATURE: 97.8 F | HEIGHT: 61 IN | OXYGEN SATURATION: 96 % | HEART RATE: 69 BPM | SYSTOLIC BLOOD PRESSURE: 119 MMHG | BODY MASS INDEX: 33.61 KG/M2 | DIASTOLIC BLOOD PRESSURE: 66 MMHG

## 2021-02-08 DIAGNOSIS — Z78.0 POSTMENOPAUSAL: ICD-10-CM

## 2021-02-08 DIAGNOSIS — C50.911 INVASIVE DUCTAL CARCINOMA OF BREAST, FEMALE, RIGHT (HCC): Primary | ICD-10-CM

## 2021-02-08 PROCEDURE — 99214 OFFICE O/P EST MOD 30 MIN: CPT | Performed by: INTERNAL MEDICINE

## 2021-02-08 RX ORDER — ANASTROZOLE 1 MG/1
TABLET ORAL
Qty: 90 TAB | Refills: 3 | Status: SHIPPED | OUTPATIENT
Start: 2021-02-08 | End: 2021-04-10

## 2021-02-08 NOTE — PROGRESS NOTES
Brad Martinez is a 61 y.o. female Follow up for the evaluation of breast cancer. 1. Have you been to the ER, urgent care clinic since your last visit? Hospitalized since your last visit? No    2. Have you seen or consulted any other health care providers outside of the [address] [place] [address] since your last visit? Include any pap smears or colon screening.  No

## 2021-02-08 NOTE — PROGRESS NOTES
[facility] at [address], [address] [facility] [address] [facility]  Terese Camden: [phone]  F: [phone]        Reason for Visit:   Minh Nowak is a 61 y.o. female who is seen for follow up of breast cancer    Breast surgeon:  Dr. Justin Villalobos physician:  Dr. René Lei    Treatment History:   · 1/18/19 right breast core bx: IDC with tubular features, gr 1, 0.4 cm, ER + at 100%, MT + at 95%, HER 2 negative at [facility] 0; site B, negative  · 2/26/19 right lumpectomy:  Invasive tubular carcinoma, 1.7 cm, gr 1, DCIS present, involving over 50% of tumor, cribriform, gr 1; no LVI, 0/6 LN, pT1c pN0 cM0  · mammaprint show low risk luminal A  · Completed XRT 5/7/19  · Anastrozole 5/27/19-    History of Present Illness: An abnormal mammogram led to the diagnosis above. Interval history: In today for follow up. Complains of gr 1 fatigue, gr 2 hot flashes.     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 Michelle Gamble    Traumatic neuroma, left neck 09/14/2010      Past Surgical History:   Procedure Laterality Date    HX BREAST BIOPSY Bilateral     benign stereo biopsies years ago    HX BREAST LUMPECTOMY Right 2/26/2019    RIGHT BREAST LUMPECTOMY WITH ULTRASOUND AND RIGHT BREAST SENTINEL NODE BIOPSY performed by Jules Fontaine MD at [address]    HX CHOLECYSTECTOMY      HX HERNIA REPAIR      Umbilical Hernia Repair    HX TUBAL LIGATION      MT PREP FACE/ORAL PROST PALATAL LIFT      MT REMOVAL OF ANAL FISSURE        Social History     Tobacco Use    Smoking status: Current Every Day Smoker     Packs/day: 1.00     Years: 20.00     Pack years: 20.00    Smokeless tobacco: Never Used   Substance Use Topics    Alcohol use: Yes     Alcohol/week: 2.0 standard drinks     Types: 2 Shots of liquor per week     Comment: rarely   2 drinks 2x/year      Family History   Problem Relation Age of Onset    Cancer Mother         Colon    Cancer Father         Lung    Cancer Brother         Brain    Cancer Paternal Aunt         Brain     Current Outpatient Medications   Medication Sig    anastrozole (ARIMIDEX) 1 mg tablet TAKE 1 TABLET BY MOUTH DAILY    gabapentin (NEURONTIN) 300 mg capsule Take 1 Cap by mouth nightly. Max Daily Amount: 300 mg.  tretinoin (RETIN-A) 0.1 % topical cream Apply  to affected area.  naproxen sod/diphenhydramine (ALEVE PM PO) Take 2 Tabs by mouth nightly.  amLODIPine (NORVASC) 5 mg tablet TK 1 T PO QD    atorvastatin (LIPITOR) 20 mg tablet TK 1 T PO QD    lisinopril-hydroCHLOROthiazide (PRINZIDE, ZESTORETIC) 20-12.5 mg per tablet TK 1 T PO QD    aspirin delayed-release 81 mg tablet Take 81 mg by mouth daily.  escitalopram oxalate (LEXAPRO) 20 mg tablet Take 20 mg by mouth daily.  omeprazole (PRILOSEC OTC) 20 mg tablet Take 20 mg by mouth daily. No current facility-administered medications for this visit. No Known Allergies     Review of Systems: A complete review of systems was obtained, negative except as described above.     Physical Exam:     Visit Vitals  /66 (BP 1 Location: Left upper arm, BP Patient Position: Sitting, BP Cuff Size: Small adult)   Pulse 69   Temp 97.8 °F (36.6 °C) (Temporal)   Resp 18   Ht 5' 1\" (1.549 m)   Wt 178 lb (80.7 kg)   SpO2 96%   BMI 33.63 kg/m²     ECOG PS: 0  General: No distress  Respiratory: Normal respiratory effort  CV: No peripheral edema  Skin: No rashes, ecchymoses, or petechiae  Psych: Alert, oriented, normal mood/affect          Results:   No results found for: WBC, HGB, HCT, PLT, MCV, ANEU, HGBPOC, HCTPOC, HGBEXT, HCTEXT, PLTEXT, HGBEXT, HCTEXT, PLTEXT  Lab Results   Component Value Date/Time    Sodium 139 02/18/2019 10:19 AM    Potassium 4.8 02/18/2019 10:19 AM    Chloride 102 02/18/2019 10:19 AM    CO2 31 02/18/2019 10:19 AM    Glucose 89 02/18/2019 10:19 AM    BUN 26 (H) 02/18/2019 10:19 AM    Creatinine 0.95 02/18/2019 10:19 AM    GFR est AA >60 02/18/2019 10:19 AM    GFR est non-AA >60 02/18/2019 10:19 AM    Calcium 9.5 02/18/2019 10:19 AM    Creatinine (POC) 1.0 02/01/2019 08:17 AM     No results found for: TBILI, ALT, AP, TP, ALB, GLOB  6/26/20 bilat 3D mammogram  Negative    6/26/20 dexa      Findings:        Femoral Neck Left:  Bone mineral density (gm/cm2): 0.879  % of peak bone mass: 85  % for age matched controls: 95  T-score: -1.1  Z-score: -0.3     Femoral Neck Right:  Bone mineral density (gm/cm2): 0.894  % of peak bone mass: 86  % for age matched controls:  80  T-score: -1.0  Z-score: -0.2     Total Hip Left:  Bone mineral density (gm/cm2): 1.047  % of peak bone mass: 104  % for age matched controls: 110  T-score: 0.3  Z-score: 0.8     Total Hip Right:  Bone mineral density (gm/cm2): 1.052  % of peak bone mass: 104  % for age matched controls:  111  T-score: 0.3  Z-score: 0.8     Lumbar Spine: L1-L4  Bone mineral density (gm/cm2): 1.167  % of peak bone mass: 98  % for age matched controls: 103  T-score: -0.2  Z-score: 0.3        IMPRESSION  Impression: This patient is osteopenic using the World Health Organization criteria  10 year probability of major osteoporotic fracture: 6.6%  10 year probability of hip fracture: 0.7%    Records reviewed and summarized above. Pathology report(s) reviewed above. Radiology report(s) reviewed above. Assessment/plan:   1. 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   postmenopausal    We explained to the patient that the goal of systemic adjuvant therapy is to improve the chances for cure and decrease the risk of relapse. We explained why a patient can have microscopic cancer spread now even though physical examination, laboratory studies and imaging studies are negative for cancer. We explained that the same treatments used now as adjuvant or preventive treatments rarely if ever are curative in women who develop metastases.     Low clinical and mammaprint risk, no indication for chemotherapy.    Continue anastrozole 1 mg daily.  Completed XRT 5/7/19 and started anastrozole on 5/27/19, tolerating well.     Mammogram due 6/2021    2. Emotional well being:  She has excellent support and is coping well with her disease; on lexapro 20 mg daily    3. Hot flashes:  Mild, on lexapro; gabapentin 300 mg qhs PRN, #30 pills, 3 refills,  reviewed.    4. Osteopenia:  DEXA 6/2020.  Recommend 2000 int units of vit D3 daily    This patient was seen in conjunction with Leonard Hammer NP. I personally reviewed the history and all points in the assessment and plan with the patient, and performed key points on the exam.       I appreciate the opportunity to participate in Ms. Maricruz Arce's care.    Signed By: Germain Barfield MD      No orders of the defined types were placed in this encounter.

## 2021-04-10 DIAGNOSIS — C50.911 INVASIVE DUCTAL CARCINOMA OF BREAST, FEMALE, RIGHT (HCC): ICD-10-CM

## 2021-04-10 RX ORDER — ANASTROZOLE 1 MG/1
TABLET ORAL
Qty: 90 TAB | Refills: 3 | Status: SHIPPED | OUTPATIENT
Start: 2021-04-10 | End: 2021-09-21 | Stop reason: SDUPTHER

## 2021-06-28 ENCOUNTER — HOSPITAL ENCOUNTER (OUTPATIENT)
Dept: MAMMOGRAPHY | Age: 61
Discharge: HOME | End: 2021-06-28
Attending: SURGERY
Payer: COMMERCIAL

## 2021-06-28 DIAGNOSIS — C50.911 INVASIVE DUCTAL CARCINOMA OF BREAST, FEMALE, RIGHT (HCC): ICD-10-CM

## 2021-06-28 PROCEDURE — 77062 BREAST TOMOSYNTHESIS BI: CPT

## 2021-08-03 ENCOUNTER — TELEPHONE (OUTPATIENT)
Dept: ONCOLOGY | Age: 61
End: 2021-08-03

## 2021-08-03 NOTE — TELEPHONE ENCOUNTER
Lm with patient to move her upcoming appointment due to Dr. Lilia Robledo being out of the office. Gave her office number to call back.

## 2021-08-17 ENCOUNTER — TRANSCRIBE ORDER (OUTPATIENT)
Dept: SCHEDULING | Age: 61
End: 2021-08-17

## 2021-08-17 DIAGNOSIS — Z12.2 ENCOUNTER FOR SPECIAL SCREENING EXAMINATION FOR NEOPLASM OF RESPIRATORY ORGAN: ICD-10-CM

## 2021-08-17 DIAGNOSIS — Z72.0 TOBACCO ABUSE DISORDER: Primary | ICD-10-CM

## 2021-08-17 DIAGNOSIS — Z13.820 SCREENING FOR OSTEOPOROSIS: Primary | ICD-10-CM

## 2021-09-03 ENCOUNTER — HOSPITAL ENCOUNTER (OUTPATIENT)
Dept: CT IMAGING | Age: 61
Discharge: HOME | End: 2021-09-03
Attending: FAMILY MEDICINE
Payer: COMMERCIAL

## 2021-09-03 DIAGNOSIS — Z12.2 ENCOUNTER FOR SPECIAL SCREENING EXAMINATION FOR NEOPLASM OF RESPIRATORY ORGAN: ICD-10-CM

## 2021-09-03 DIAGNOSIS — Z72.0 TOBACCO ABUSE DISORDER: ICD-10-CM

## 2021-09-03 PROCEDURE — 71271 CT THORAX LUNG CANCER SCR C-: CPT

## 2021-09-10 ENCOUNTER — OFFICE VISIT (OUTPATIENT)
Dept: CARDIOLOGY CLINIC | Age: 61
End: 2021-09-10
Payer: COMMERCIAL

## 2021-09-10 ENCOUNTER — TELEPHONE (OUTPATIENT)
Dept: CARDIOLOGY CLINIC | Age: 61
End: 2021-09-10

## 2021-09-10 VITALS
HEIGHT: 61 IN | SYSTOLIC BLOOD PRESSURE: 130 MMHG | BODY MASS INDEX: 32.28 KG/M2 | DIASTOLIC BLOOD PRESSURE: 80 MMHG | RESPIRATION RATE: 16 BRPM | OXYGEN SATURATION: 95 % | WEIGHT: 171 LBS | HEART RATE: 69 BPM

## 2021-09-10 DIAGNOSIS — C50.911 INVASIVE DUCTAL CARCINOMA OF BREAST, FEMALE, RIGHT (HCC): ICD-10-CM

## 2021-09-10 DIAGNOSIS — R00.2 HEART PALPITATIONS: Primary | ICD-10-CM

## 2021-09-10 DIAGNOSIS — I10 BENIGN ESSENTIAL HTN: ICD-10-CM

## 2021-09-10 DIAGNOSIS — Z72.0 TOBACCO USE: ICD-10-CM

## 2021-09-10 PROCEDURE — 93000 ELECTROCARDIOGRAM COMPLETE: CPT | Performed by: INTERNAL MEDICINE

## 2021-09-10 PROCEDURE — 99244 OFF/OP CNSLTJ NEW/EST MOD 40: CPT | Performed by: INTERNAL MEDICINE

## 2021-09-10 RX ORDER — DEXTROAMPHETAMINE SACCHARATE, AMPHETAMINE ASPARTATE MONOHYDRATE, DEXTROAMPHETAMINE SULFATE AND AMPHETAMINE SULFATE 5; 5; 5; 5 MG/1; MG/1; MG/1; MG/1
CAPSULE, EXTENDED RELEASE ORAL
COMMUNITY
Start: 2021-07-27 | End: 2021-11-12

## 2021-09-10 NOTE — PROGRESS NOTES
[place]: [place]  [phone]    History of Present Illness:  Ms. Andrew Workman is a 62 yo F with history of breast cancer, status post lumpectomy in 2019 and then radiation therapy in remission followed by Dr. Julee Rg, essential hypertension, mixed hyperlipidemia, positive tobacco use working on cessation, referred by Dr. Segundo Patel for cardiac evaluation. She also has ADHD followed by psychiatry. She is here due to palpitations. They have been occurring almost daily lasting one to two hours at a time sometimes associated with lightheadedness or dizziness, but no syncope. This last occurred last night. Of note, she saw Dr. Segundo Patel for this and they started having her take Adderall every other day with the idea of possibly weaning off of this at some point. She does think that going on the decreased dosage helped decrease her palpitations substantially. She is going to see psychiatry on 10/03/2021. Separate from these episodes, she denies any exertional chest pain. Her breathing has been stable. She is compensated on exam with clear lungs and no lower extremity edema. Her EKG here is normal sinus rhythm, nonspecific ST-T wave abnormality, heart rate of 66, normal axis. Soc hx. Tobacco use, seeing acupunture  Fam hx. No early CAD  Assessment and Plan:   1. Palpitations. Will obtain an echocardiogram and one month loop monitor to evaluate for possible arrhythmia. 2. Breast cancer, in remission, status post lumpectomy and radiation therapy. 3. Essential hypertension. Blood pressure is controlled. 4. Mixed hyperlipidemia. Tolerating statin. 5. Tobacco use. Working on cessation. She is going to try acupuncture.         She  has a past medical history of Autoimmune disease ([address] (2016), Breast cancer ([address] (02/26/2019), Cancer ([address] (01/2019), Depression, GERD (gastroesophageal reflux disease), High cholesterol, Hypertension, Menopause (07/2019), Mixed connective tissue disease ([address] (2017), S/P radiation therapy (2019), and Traumatic neuroma, left neck (09/14/2010). She also has no past medical history of Fracture. All other systems negative except as above. PE  Vitals:    09/10/21 1009   BP: 130/80   Pulse: 69   Resp: 16   SpO2: 95%   Weight: 171 lb (77.6 kg)   Height: 5' 1\" (1.549 m)    Body mass index is 32.31 kg/m². General appearance - alert, well appearing, and in no distress  Mental status - affect appropriate to mood  Eyes - sclera anicteric, moist mucous membranes  Neck - supple, no JVD  Chest - clear to auscultation, no wheezes, rales or rhonchi  Heart - normal rate, regular rhythm, normal S1, S2, no murmurs, rubs, clicks or gallops  Abdomen - soft, nontender, nondistended, no masses or organomegaly  Neurological - no focal deficit  Extremities - peripheral pulses normal, no pedal edema    Recent Labs:  No results found for: CHOL, CHOLX, CHLST, CHOLV, 598711, HDL, HDLP, LDL, LDLC, DLDLP, TGLX, TRIGL, TRIGP, CHHD, CHHDX  Lab Results   Component Value Date/Time    Creatinine (POC) 1.0 02/01/2019 08:17 AM    Creatinine 0.95 02/18/2019 10:19 AM     Lab Results   Component Value Date/Time    BUN 26 (H) 02/18/2019 10:19 AM     Lab Results   Component Value Date/Time    Potassium 4.8 02/18/2019 10:19 AM     No results found for: HBA1C, VSD6HUFP  No results found for: HGBPOC, HGB, HGBP, HGBEXT  No results found for: PLT, PLTEXT    Reviewed:  Past Medical History:   Diagnosis Date    Autoimmune disease ([address] 2016    Mixed Connective Tissue disease / Chronic Fatigued    [facility] 02/26/2019    Right lumpectomy with radiation.  Cancer ([facility] 01/2019    RIGHT breast     Depression     GERD (gastroesophageal reflux disease)     High cholesterol     Hypertension     Menopause 07/2019    Mixed connective tissue disease ([facility] 2017    Sees Dr. Chaitanya Wilson S/P radiation therapy 2019    Right lumpectomy with radiation.     Traumatic neuroma, left neck 09/14/2010     Social History     Tobacco Use   Smoking Status Current Every Day Smoker    Packs/day: 1.00    Years: 20.00    Pack years: 20.00   Smokeless Tobacco Never Used     Social History     Substance and Sexual Activity   Alcohol Use Yes    Alcohol/week: 2.0 standard drinks    Types: 2 Shots of liquor per week    Comment: rarely   2 drinks 2x/year     No Known Allergies    Current Outpatient Medications   Medication Sig    amphetamine-dextroamphetamine XR (ADDERALL XR) 20 mg XR capsule     anastrozole (ARIMIDEX) 1 mg tablet TAKE 1 TABLET BY MOUTH EVERY DAY    naproxen sod/diphenhydramine (ALEVE PM PO) Take 2 Tabs by mouth nightly.  amLODIPine (NORVASC) 5 mg tablet TK 1 T PO QD    atorvastatin (LIPITOR) 20 mg tablet TK 1 T PO QD    lisinopril-hydroCHLOROthiazide (PRINZIDE, ZESTORETIC) 20-12.5 mg per tablet TK 1 T PO QD    aspirin delayed-release 81 mg tablet Take 81 mg by mouth daily.  escitalopram oxalate (LEXAPRO) 20 mg tablet Take 20 mg by mouth daily.  omeprazole (PRILOSEC OTC) 20 mg tablet Take 20 mg by mouth daily.  gabapentin (NEURONTIN) 300 mg capsule Take 1 Cap by mouth nightly. Max Daily Amount: 300 mg.  tretinoin (RETIN-A) 0.1 % topical cream Apply  to affected area. No current facility-administered medications for this visit.        Michell Judd MD  [facility] heart and [facility]ascular [place]  [address] [facility] [address]  [address]

## 2021-09-10 NOTE — TELEPHONE ENCOUNTER
----- Message from Esteban Vega RN sent at 9/10/2021 10:36 AM EDT -----  30 day loop for palp.  Dr. Alfredo Reynaga     Thank you   Reginold Bumpers

## 2021-09-10 NOTE — LETTER
Patient: Uriel Grey   YOB: 1960  Date of Visit: 9/10/2021      Dear Luiza Mcneill MD  [address]  [address] [place] [address]  Via Fax: [phone]: Thank you for referring Ms. Uriel Grey to me for evaluation/treatment. Below are the relevant portions of my assessment and plan of care. Ms. Manolo Warner is a 60 yo F with history of breast cancer, status post lumpectomy in 2019 and then radiation therapy in remission followed by Dr. Peewee Lizarraga, essential hypertension, mixed hyperlipidemia, positive tobacco use working on cessation, referred by Dr. Tabatha Quarles for cardiac evaluation. She also has ADHD followed by psychiatry. She is here due to palpitations. They have been occurring almost daily lasting one to two hours at a time sometimes associated with lightheadedness or dizziness, but no syncope. This last occurred last night. Of note, she saw Dr. Tabatha Quarles for this and they started having her take Adderall every other day with the idea of possibly weaning off of this at some point. She does think that going on the decreased dosage helped decrease her palpitations substantially. She is going to see psychiatry on 10/03/2021. Separate from these episodes, she denies any exertional chest pain. Her breathing has been stable. She is compensated on exam with clear lungs and no lower extremity edema. Her EKG here is normal sinus rhythm, nonspecific ST-T wave abnormality, heart rate of 66, normal axis. Soc hx. Tobacco use, seeing acupunture  Fam hx. No early CAD  Assessment and Plan:   1. Palpitations. Will obtain an echocardiogram and one month loop monitor to evaluate for possible arrhythmia. 2. Breast cancer, in remission, status post lumpectomy and radiation therapy. 3. Essential hypertension. Blood pressure is controlled. 4. Mixed hyperlipidemia. Tolerating statin. 5. Tobacco use. Working on cessation. She is going to try acupuncture.       If you have questions, please do not hesitate to call me.      Sincerely,      Mirna Mayorga MD

## 2021-09-10 NOTE — PATIENT INSTRUCTIONS
A 30 day loop monitor has been ordered for you. This will be mailed to the address given to us. Please read over the instructions given to you about [employer] loop monitors. If you have any insurance questions regarding coverage and out of pocket cost please contact the number below.       If you have any billing questions regarding deductible or responsibility call ([phone])   If you need additional supplies or issue with your monitor please call ([phone])

## 2021-09-21 ENCOUNTER — DOCUMENTATION ONLY (OUTPATIENT)
Dept: CARDIOLOGY CLINIC | Age: 61
End: 2021-09-21

## 2021-09-21 ENCOUNTER — OFFICE VISIT (OUTPATIENT)
Dept: ONCOLOGY | Age: 61
End: 2021-09-21
Payer: COMMERCIAL

## 2021-09-21 ENCOUNTER — TELEPHONE (OUTPATIENT)
Dept: CARDIOLOGY CLINIC | Age: 61
End: 2021-09-21

## 2021-09-21 VITALS
WEIGHT: 172 LBS | BODY MASS INDEX: 32.47 KG/M2 | RESPIRATION RATE: 18 BRPM | DIASTOLIC BLOOD PRESSURE: 64 MMHG | OXYGEN SATURATION: 95 % | SYSTOLIC BLOOD PRESSURE: 131 MMHG | HEIGHT: 61 IN | TEMPERATURE: 98 F | HEART RATE: 67 BPM

## 2021-09-21 DIAGNOSIS — C50.911 INVASIVE DUCTAL CARCINOMA OF BREAST, FEMALE, RIGHT (HCC): Primary | ICD-10-CM

## 2021-09-21 PROCEDURE — 99213 OFFICE O/P EST LOW 20 MIN: CPT | Performed by: INTERNAL MEDICINE

## 2021-09-21 RX ORDER — ANASTROZOLE 1 MG/1
TABLET ORAL
Qty: 90 TABLET | Refills: 3 | Status: SHIPPED | OUTPATIENT
Start: 2021-09-21 | End: 2022-09-21 | Stop reason: SDUPTHER

## 2021-09-21 NOTE — TELEPHONE ENCOUNTER
----- Message from Emerald Lau MD sent at 9/20/2021  5:03 PM EDT -----  Please let pt know loop showed afib with RVR. Recommend starting cardizem  mg once daily to replace amlodipine. Start eliquis 5 mg bid for stroke prevention. Follow up within next 1-2 weeks. If she wants to wait until the visit to start eliquis that is ok. No aspirin if taking eliquis.  thx

## 2021-09-21 NOTE — PROGRESS NOTES
Charise Simmonds is a 61 y.o. female Follow up for the evaluation of breast cancer. 1. Have you been to the ER, urgent care clinic since your last visit? Hospitalized since your last visit? No    2. Have you seen or consulted any other health care providers outside of the [address] [place] [address] since your last visit? Include any pap smears or colon screening.  No

## 2021-09-21 NOTE — PROGRESS NOTES
[facility] [place] [facility] [place] [address] [address] [facility] [place]: [phone]  F: [phone]    Reason for Visit:   Gil Hernandez is a 61 y.o. female who is seen for follow up of breast cancer    Breast surgeon:  Dr. Katarina Murguia physician:  Dr. Natalie Melo    Treatment History:   · 1/18/19 right breast core bx: IDC with tubular features, gr 1, 0.4 cm, ER + at 100%, VT + at 95%, HER 2 negative at [facility] 0; site B, negative  · 2/26/19 right lumpectomy:  Invasive tubular carcinoma, 1.7 cm, gr 1, DCIS present, involving over 50% of tumor, cribriform, gr 1; no LVI, 0/6 LN, pT1c pN0 cM0  · mammaprint show low risk luminal A  · Completed XRT 5/7/19  · Anastrozole 5/27/19-    History of Present Illness: An abnormal mammogram led to the diagnosis above. Interval history: In today for follow up. Reports gr 1 fatigue, gr 1 hot flashes, gr 1 tachycardia. She states she is wearing a turner monitor due to increased heart rate. FH:  No breast or ovarian cancer or prostate cancer or pancreas cancer; does have FH of GBMs    LMP 7/2018    Past Medical History:   Diagnosis Date    Autoimmune disease ([address] 2016    Mixed Connective Tissue disease / Chronic Fatigued    Breast cancer [facility] 02/26/2019    Right lumpectomy with radiation.  Cancer ([address] 01/2019    RIGHT breast     Depression     GERD (gastroesophageal reflux disease)     High cholesterol     Hypertension     Menopause 07/2019    Mixed connective tissue disease ([address] 2017    Sees Dr. Jaswinder Navarrete S/P radiation therapy 2019    Right lumpectomy with radiation.     Traumatic neuroma, left neck 09/14/2010      Past Surgical History:   Procedure Laterality Date    HX BREAST LUMPECTOMY Right 2/26/2019    RIGHT BREAST LUMPECTOMY WITH ULTRASOUND AND RIGHT BREAST SENTINEL NODE BIOPSY performed by Fareed Herrmann MD at [address]    HX CHOLECYSTECTOMY      HX HERNIA REPAIR      Umbilical Hernia Repair    HX TUBAL LIGATION      JEANIE STEREO VAC  BX BREAST LT 1ST LESION W/CLIP AND SPECIMEN Left Unknown    Benign x 2    JEANIE STEREO VAC  BX BREAST RT 1ST LESION W/CLIP AND SPECIMEN Right 01/18/2019    Positive for breast cancer    ND PREP FACE/ORAL PROST PALATAL LIFT      ND REMOVAL OF ANAL FISSURE        Social History     Tobacco Use    Smoking status: Current Every Day Smoker     Packs/day: 1.00     Years: 20.00     Pack years: 20.00    Smokeless tobacco: Never Used   Substance Use Topics    Alcohol use: Yes     Alcohol/week: 2.0 standard drinks     Types: 2 Shots of liquor per week     Comment: rarely   2 drinks 2x/year      Family History   Problem Relation Age of Onset    Cancer Mother         Colon    Cancer Father         Lung    Cancer Brother         Brain    Cancer Paternal Aunt         Brain     Current Outpatient Medications   Medication Sig    anastrozole (ARIMIDEX) 1 mg tablet TAKE 1 TABLET BY MOUTH EVERY DAY    amphetamine-dextroamphetamine XR (ADDERALL XR) 20 mg XR capsule     gabapentin (NEURONTIN) 300 mg capsule Take 1 Cap by mouth nightly. Max Daily Amount: 300 mg.  tretinoin (RETIN-A) 0.1 % topical cream Apply  to affected area.  naproxen sod/diphenhydramine (ALEVE PM PO) Take 2 Tabs by mouth nightly.  amLODIPine (NORVASC) 5 mg tablet TK 1 T PO QD    atorvastatin (LIPITOR) 20 mg tablet TK 1 T PO QD    lisinopril-hydroCHLOROthiazide (PRINZIDE, ZESTORETIC) 20-12.5 mg per tablet TK 1 T PO QD    aspirin delayed-release 81 mg tablet Take 81 mg by mouth daily.  escitalopram oxalate (LEXAPRO) 20 mg tablet Take 20 mg by mouth daily.  omeprazole (PRILOSEC OTC) 20 mg tablet Take 20 mg by mouth daily. No current facility-administered medications for this visit. No Known Allergies     Review of Systems: A complete review of systems was obtained, negative except as described above.     Physical Exam:     Visit Vitals  /64   Pulse 67   Temp 98 °F (36.7 °C) (Temporal)   Resp 18   Ht 5' 1\" (1.549 m)   Wt 172 lb (78 kg)   LMP 07/01/2019 (Approximate)   SpO2 95%   BMI 32.50 kg/m²     ECOG PS: 0  General: No distress  Respiratory: Normal respiratory effort  CV: No peripheral edema, cardiac monitor in place   Skin: No rashes, ecchymoses, or petechiae  Psych: Alert, oriented, normal mood/affect      Results:   No results found for: WBC, HGB, HCT, PLT, MCV, ANEU, HGBPOC, HCTPOC, HGBEXT, HCTEXT, PLTEXT, HGBEXT, HCTEXT, PLTEXT  Lab Results   Component Value Date/Time    Sodium 139 02/18/2019 10:19 AM    Potassium 4.8 02/18/2019 10:19 AM    Chloride 102 02/18/2019 10:19 AM    CO2 31 02/18/2019 10:19 AM    Glucose 89 02/18/2019 10:19 AM    BUN 26 (H) 02/18/2019 10:19 AM    Creatinine 0.95 02/18/2019 10:19 AM    GFR est AA >60 02/18/2019 10:19 AM    GFR est non-AA >60 02/18/2019 10:19 AM    Calcium 9.5 02/18/2019 10:19 AM    Creatinine (POC) 1.0 02/01/2019 08:17 AM     No results found for: TBILI, ALT, AP, TP, ALB, GLOB  6/26/20 bilat 3D mammogram  Negative    6/26/20 dexa      Findings:        Femoral Neck Left:  Bone mineral density (gm/cm2): 0.879  % of peak bone mass: 85  % for age matched controls: 95  T-score: -1.1  Z-score: -0.3     Femoral Neck Right:  Bone mineral density (gm/cm2): 0.894  % of peak bone mass: 86  % for age matched controls:  80  T-score: -1.0  Z-score: -0.2     Total Hip Left:  Bone mineral density (gm/cm2): 1.047  % of peak bone mass: 104  % for age matched controls: 110  T-score: 0.3  Z-score: 0.8     Total Hip Right:  Bone mineral density (gm/cm2): 1.052  % of peak bone mass: 104  % for age matched controls:  111  T-score: 0.3  Z-score: 0.8     Lumbar Spine: L1-L4  Bone mineral density (gm/cm2): 1.167  % of peak bone mass: 98  % for age matched controls: 103  T-score: -0.2  Z-score: 0.3        IMPRESSION  Impression:      This patient is osteopenic using the World Health Organization criteria  10 year probability of major osteoporotic fracture: 6.6%  10 year probability of hip fracture: 0.7%    6/28/2021  IMPRESSION  1. BIRADS ASSESSMENT CATEGORY 2, benign. 2.  Followup diagnostic mammography is recommended for a total of 5 years  postoperatively, unless earlier suggested clinically. 3.  These findings have been relayed to the patient.       Records reviewed and summarized above. Pathology report(s) reviewed above. Radiology report(s) reviewed above. Assessment/plan:   1. Right central invasive tubular cancer, gr 1, ER +, CA +, HER 2 negative, 1.7 cm, 0/6 LN:  Stage IA (both). postmenopausal    We explained to the patient that the goal of systemic adjuvant therapy is to improve the chances for cure and decrease the risk of relapse. We explained why a patient can have microscopic cancer spread now even though physical examination, laboratory studies and imaging studies are negative for cancer. We explained that the same treatments used now as adjuvant or preventive treatments rarely if ever are curative in women who develop metastases. Low clinical and mammaprint risk, no indication for chemotherapy. Continue anastrozole 1 mg daily. Completed XRT 5/7/19 and started anastrozole on 5/27/19, tolerating well. Mammogram 6/2021 benign, next due 6/2022.     2. Emotional well being:  She has excellent support and is coping well with her disease; on lexapro 20 mg daily. 3. Hot flashes:  Mild, on lexapro; Previously taking gabapentin nightly but stopped due to fatigue. 4. Osteopenia:  DEXA 6/2020, next due 6/2022 - ordered by her PCP. Recommend 2000 int units of vit D3 daily and 2-3 servings dietary calcium. 5. Tachycardia: wearing cardiac monitor today; Follows with Dr. Leland Duran    RTCLAIRE in 1 year or sooner if needed. This patient was seen in conjunction with Nelson Austin NP.  I personally reviewed the history and all points in the assessment and plan, and performed key points on the exam.  Right breast cancer, on anastrozole, JOSELITO, RTC 1 year      I appreciate the opportunity to participate in Ms. Ania Magaña Claude's care. Signed By: Char Weiss MD      No orders of the defined types were placed in this encounter.

## 2021-10-01 ENCOUNTER — ANCILLARY PROCEDURE (OUTPATIENT)
Dept: CARDIOLOGY CLINIC | Age: 61
End: 2021-10-01
Payer: COMMERCIAL

## 2021-10-01 VITALS
BODY MASS INDEX: 32.47 KG/M2 | HEIGHT: 61 IN | SYSTOLIC BLOOD PRESSURE: 130 MMHG | DIASTOLIC BLOOD PRESSURE: 64 MMHG | WEIGHT: 172 LBS

## 2021-10-01 DIAGNOSIS — C50.911 INVASIVE DUCTAL CARCINOMA OF BREAST, FEMALE, RIGHT (HCC): ICD-10-CM

## 2021-10-01 DIAGNOSIS — Z72.0 TOBACCO USE: ICD-10-CM

## 2021-10-01 DIAGNOSIS — R00.2 HEART PALPITATIONS: ICD-10-CM

## 2021-10-01 DIAGNOSIS — I10 BENIGN ESSENTIAL HTN: ICD-10-CM

## 2021-10-01 LAB
ECHO AO ROOT DIAM: 2.89 CM
ECHO AV MEAN GRADIENT: 5.23 MMHG
ECHO AV PEAK GRADIENT: 8.99 MMHG
ECHO AV PEAK VELOCITY: 149.93 CM/S
ECHO AV VTI: 31.57 CM
ECHO IVC PROX: 1.25 CM
ECHO LA AREA 4C: 16.35 CM2
ECHO LA MAJOR AXIS: 3.21 CM
ECHO LA MINOR AXIS: 1.81 CM
ECHO LA VOL 2C: 50.73 ML (ref 22–52)
ECHO LA VOL 4C: 36.59 ML (ref 22–52)
ECHO LA VOL BP: 46.93 ML (ref 22–52)
ECHO LA VOL/BSA BIPLANE: 26.51 ML/M2 (ref 16–28)
ECHO LA VOLUME INDEX A2C: 28.66 ML/M2 (ref 16–28)
ECHO LA VOLUME INDEX A4C: 20.67 ML/M2 (ref 16–28)
ECHO LV E' LATERAL VELOCITY: 12.13 CM/S
ECHO LV E' SEPTAL VELOCITY: 11.63 CM/S
ECHO LV EDV A2C: 95.02 ML
ECHO LV EDV A4C: 92.49 ML
ECHO LV EDV BP: 94.06 ML (ref 56–104)
ECHO LV EDV INDEX A4C: 52.3 ML/M2
ECHO LV EDV INDEX BP: 53.1 ML/M2
ECHO LV EDV NDEX A2C: 53.7 ML/M2
ECHO LV EJECTION FRACTION A2C: 68 PERCENT
ECHO LV EJECTION FRACTION A4C: 62 PERCENT
ECHO LV EJECTION FRACTION BIPLANE: 64.9 PERCENT (ref 55–100)
ECHO LV ESV A2C: 30.21 ML
ECHO LV ESV A4C: 35.2 ML
ECHO LV ESV BP: 33.06 ML (ref 19–49)
ECHO LV ESV INDEX A2C: 17.1 ML/M2
ECHO LV ESV INDEX A4C: 19.9 ML/M2
ECHO LV ESV INDEX BP: 18.7 ML/M2
ECHO LV GLOBAL LONGITUDINAL STRAIN (GLS): -20.6 PERCENT
ECHO LV INTERNAL DIMENSION DIASTOLIC: 3.83 CM (ref 3.9–5.3)
ECHO LV INTERNAL DIMENSION SYSTOLIC: 2.09 CM
ECHO LV IVSD: 0.88 CM (ref 0.6–0.9)
ECHO LV MASS 2D: 115.4 G (ref 67–162)
ECHO LV MASS INDEX 2D: 65.2 G/M2 (ref 43–95)
ECHO LV POSTERIOR WALL DIASTOLIC: 1.08 CM (ref 0.6–0.9)
ECHO LVOT DIAM: 2.08 CM
ECHO MV A VELOCITY: 107.41 CM/S
ECHO MV AREA PHT: 3.94 CM2
ECHO MV E DECELERATION TIME (DT): 192.42 MS
ECHO MV E VELOCITY: 89.59 CM/S
ECHO MV E/A RATIO: 0.83
ECHO MV E/E' LATERAL: 7.39
ECHO MV E/E' RATIO (AVERAGED): 7.54
ECHO MV E/E' SEPTAL: 7.7
ECHO MV PRESSURE HALF TIME (PHT): 55.8 MS
ECHO RA MAJOR AXIS: 3.68 CM
ECHO RV INTERNAL DIMENSION: 3.24 CM
ECHO RV TAPSE: 1.66 CM (ref 1.5–2)
GLOBAL LONGITUDINAL STRAIN 2 CHAMBER: -24.2 PERCENT
GLOBAL LONGITUDINAL STRAIN 4 CHAMBER: -16.5 PERCENT
GLOBAL LONGITUDINAL STRAIN LONG AXIS: -21 PERCENT
LA VOL DISK BP: 43.51 ML (ref 22–52)

## 2021-10-01 PROCEDURE — 93306 TTE W/DOPPLER COMPLETE: CPT | Performed by: INTERNAL MEDICINE

## 2021-10-01 RX ORDER — DILTIAZEM HYDROCHLORIDE 120 MG/1
120 CAPSULE, COATED, EXTENDED RELEASE ORAL DAILY
Qty: 30 CAPSULE | Refills: 5 | Status: SHIPPED | OUTPATIENT
Start: 2021-10-01 | End: 2021-11-12

## 2021-10-01 NOTE — TELEPHONE ENCOUNTER
Patient was in the office today for an echo. Two patient indentifiers verified. Pt was informed of results. Pharmacy was verified. Pt scheduled for follow up appointment. Pt verbalized understanding and denies any further questions at this time.      Future Appointments   Date Time Provider [place]   10/8/2021  1:40 PM MD KISHA Fitch AMB   9/21/2022  8:15 AM Laura Salmon, M[facility] BS AMB

## 2021-10-08 ENCOUNTER — OFFICE VISIT (OUTPATIENT)
Dept: CARDIOLOGY CLINIC | Age: 61
End: 2021-10-08
Payer: COMMERCIAL

## 2021-10-08 VITALS
OXYGEN SATURATION: 98 % | HEIGHT: 61 IN | SYSTOLIC BLOOD PRESSURE: 120 MMHG | RESPIRATION RATE: 14 BRPM | WEIGHT: 171.6 LBS | DIASTOLIC BLOOD PRESSURE: 80 MMHG | HEART RATE: 66 BPM | BODY MASS INDEX: 32.4 KG/M2

## 2021-10-08 DIAGNOSIS — R06.02 SHORT OF BREATH ON EXERTION: ICD-10-CM

## 2021-10-08 DIAGNOSIS — I10 BENIGN ESSENTIAL HTN: ICD-10-CM

## 2021-10-08 DIAGNOSIS — I20.0 UNSTABLE ANGINA (HCC): ICD-10-CM

## 2021-10-08 DIAGNOSIS — I48.0 PAROXYSMAL A-FIB (HCC): Primary | ICD-10-CM

## 2021-10-08 PROCEDURE — 99214 OFFICE O/P EST MOD 30 MIN: CPT | Performed by: INTERNAL MEDICINE

## 2021-10-08 RX ORDER — VARENICLINE TARTRATE 25 MG
KIT ORAL
Qty: 1 DOSE PACK | Refills: 0 | Status: SHIPPED | OUTPATIENT
Start: 2021-10-08 | End: 2021-11-12

## 2021-10-08 RX ORDER — METOPROLOL SUCCINATE 50 MG/1
50 TABLET, EXTENDED RELEASE ORAL DAILY
Qty: 90 TABLET | Refills: 1 | Status: SHIPPED | OUTPATIENT
Start: 2021-10-08 | End: 2021-11-12

## 2021-10-08 NOTE — PATIENT INSTRUCTIONS
You will be scheduled for a Nuclear Stress Test after your appointment today. Nuclear stress testing evaluates blood flow to your heart muscle and assesses cardiac function. There are 2 parts (Rest/Stress) to this procedure and will include either an exercise on a treadmill or an IV administration of a stressing medication called Lexiscan. Your cardiologist will determine which type of testing is best for you. This test can be performed in one day unless it is determined that better quality images will be obtained by performing the test over two days. *Please arrive 15 minutes prior to your appointment time    Test Duration:    -One day testing will take 4 hours   -Two day testing will take 2 hours each day. Your second day(resting images) will be scheduled after the first day is completed    Day of testing instructions:    1. NO CAFFEINE (not even decaffeinated products) 24 HOURS PRIOR TO TESTING. This includes coffee, soda, tea, chocolate, multivitamins, and migraine medication, like Excedrin or Fioricet that contains caffeine. 2. Nothing to eat or drink 4 HOURS prior to testing  3. NO NICOTINE 12 hours prior to testing  4. Hold any medications requested by your cardiologist. Otherwise take medications as directed with a few sips of water. If you are unsure you may bring your medications with you to take after instructed by your stressing nurse. It is recommended you hold no medication prior to your test. DIABETIC PATIENTS: Take half of your insulin with a light meal 4 hours before your test.  5. Wear comfortable clothes and shoes (Shirts with no metal, shorts or pants, tennis shoes, no heels or flip flops)    IMPORTANT: This testing involves a cardiac tracer ordered specifically for you. If you are unable to make your appointment, please call to cancel/reschedule AT LEAST 24 hours prior to your appointment so your tracer can be cancelled. [phone].

## 2021-10-08 NOTE — LETTER
Patient: Smith Diane   YOB: 1960  Date of Visit: 10/8/2021      Dear Nora Boateng MD  [address] [address]  Via Fax: [phone]:      Ms. Cornel Muñoz is a 62 yo F with history of breast cancer, status post lumpectomy in 2019 and then radiation therapy in remission followed by Dr. Katerina Linn, essential hypertension, mixed hyperlipidemia, positive tobacco use working on cessation, ADHD followed by psychiatry. On her last visit due to palpitations, had her obtain a loop monitor and echocardiogram for further evaluation. Her echocardiogram was normal.  However, loop monitor did show episodes of atrial fibrillation with rapid ventricular heart rate, at times in the 180-200 beats per minute when she was in atrial fibrillation. Started her on Cardizem for rate control to replace Amlodipine and Eliquis for stroke prevention. Since starting the medication, she says her symptoms did get better. However, last night she did feel her heart go fast for one to two hours and some shortness of breath with this. When she saw her primary care physician last week, they did stop her Adderall. She is compensated on exam with clear lungs and no lower extremity edema. Soc hx. Tobacco use, seeing acupunture  Fam hx. No early CAD  Assessment and Plan:   1. Paroxysmal atrial fibrillation. She is still having breakthrough symptoms despite the Diltiazem and will add Toprol 50 mg to take opposite of this. Will have her stop her Lisinopril/Hydrochlorothiazide to avoid hypotension. Will have her follow back in one month. 2. Unstable angina. Exertional shortness of breath at times, possible anginal equivalent and will proceed with a stress test for further evaluation. She needs to stay on medication for rate control and will do this Lexiscan. 3. Essential hypertension. Adjustments as noted above. 4. Mixed hyperlipidemia. Tolerating statin. 5. Tobacco use. Working on cessation. She does want a prescription to restart Chantix and will give her a starter kit. 6. Breast cancer, in remission, status post lumpectomy and radiation therapy. If you have questions, please do not hesitate to call me.      Sincerely,      Lina Shields MD

## 2021-10-08 NOTE — PROGRESS NOTES
[place]: [place]  [phone]    History of Present Illness:  Ms. Gualberto Zuniga is a 60 yo F with history of breast cancer, status post lumpectomy in 2019 and then radiation therapy in remission followed by Dr. Dieter Jama, essential hypertension, mixed hyperlipidemia, positive tobacco use working on cessation, ADHD followed by psychiatry. On her last visit due to palpitations, had her obtain a loop monitor and echocardiogram for further evaluation. Her echocardiogram was normal.  However, loop monitor did show episodes of atrial fibrillation with rapid ventricular heart rate, at times in the 180-200 beats per minute when she was in atrial fibrillation. Started her on Cardizem for rate control to replace Amlodipine and Eliquis for stroke prevention. Since starting the medication, she says her symptoms did get better. However, last night she did feel her heart go fast for one to two hours and some shortness of breath with this. When she saw her primary care physician last week, they did stop her Adderall. She is compensated on exam with clear lungs and no lower extremity edema. Soc hx. Tobacco use, seeing acupunture  Fam hx. No early CAD  Assessment and Plan:   1. Paroxysmal atrial fibrillation. She is still having breakthrough symptoms despite the Diltiazem and will add Toprol 50 mg to take opposite of this. Will have her stop her Lisinopril/Hydrochlorothiazide to avoid hypotension. Will have her follow back in one month. 2. Unstable angina. Exertional shortness of breath at times, possible anginal equivalent and will proceed with a stress test for further evaluation. She needs to stay on medication for rate control and will do this Lexiscan. 3. Essential hypertension. Adjustments as noted above. 4. Mixed hyperlipidemia. Tolerating statin. 5. Tobacco use. Working on cessation. She does want a prescription to restart Chantix and will give her a starter kit.     6. Breast cancer, in remission, status post lumpectomy and radiation therapy. 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 She also has no past medical history of Fracture. All other systems negative except as above. PE  Vitals:    10/08/21 1337   BP: 120/80   Pulse: 66   Resp: 14   SpO2: 98%   Weight: 171 lb 9.6 oz (77.8 kg)   Height: 5' 1\" (1.549 m)    Body mass index is 32.42 kg/m².    General appearance - alert, well appearing, and in no distress  Mental status - affect appropriate to mood  Eyes - sclera anicteric, moist mucous membranes  Neck - supple, no JVD  Chest - clear to auscultation, no wheezes, rales or rhonchi  Heart - normal rate, regular rhythm, normal S1, S2, no murmurs, rubs, clicks or gallops  Abdomen - soft, nontender, nondistended, no masses or organomegaly  Neurological - no focal deficit  Extremities - peripheral pulses normal, no pedal edema    Recent Labs:  No results found for: CHOL, CHOLX, CHLST, CHOLV, 814091, HDL, HDLP, LDL, LDLC, DLDLP, TGLX, TRIGL, TRIGP, CHHD, CHHDX  Lab Results   Component Value Date/Time    Creatinine (POC) 1.0 02/01/2019 08:17 AM    Creatinine 0.95 02/18/2019 10:19 AM     Lab Results   Component Value Date/Time    BUN 26 (H) 02/18/2019 10:19 AM     Lab Results   Component Value Date/Time    Potassium 4.8 02/18/2019 10:19 AM     No results found for: HBA1C, GCR5IVGB, FYG7SDZD  No results found for: HGBPOC, HGB, HGBP, HGBEXT, HGBEXT  No results found for: PLT, PLTEXT, PLTEXT    Reviewed:  Past Medical History:   Diagnosis Date    Autoimmune disease ([facility] 2016    Mixed Connective Tissue disease / Chronic Fatigued    Breast cancer ([facility] 02/26/2019    Right lumpectomy with radiation.  [facility] 01/2019    RIGHT breast     Depression     GERD (gastroesophageal reflux disease)     High cholesterol     Hypertension     Menopause 07/2019    [facility] 2017    Sees Dr. Dwayne Lloyd S/P radiation therapy 2019    Right lumpectomy with radiation.  Traumatic neuroma, left neck 09/14/2010     Social History     Tobacco Use   Smoking Status Current Every Day Smoker    Packs/day: 1.00    Years: 20.00    Pack years: 20.00   Smokeless Tobacco Never Used     Social History     Substance and Sexual Activity   Alcohol Use Yes    Alcohol/week: 2.0 standard drinks    Types: 2 Shots of liquor per week    Comment: rarely   2 drinks 2x/year     No Known Allergies    Current Outpatient Medications   Medication Sig    apixaban (ELIQUIS) 5 mg tablet Take 1 Tablet by mouth two (2) times a day.  dilTIAZem ER (CARDIZEM CD) 120 mg capsule Take 1 Capsule by mouth daily.  anastrozole (ARIMIDEX) 1 mg tablet TAKE 1 TABLET BY MOUTH EVERY DAY    amphetamine-dextroamphetamine XR (ADDERALL XR) 20 mg XR capsule     gabapentin (NEURONTIN) 300 mg capsule Take 1 Cap by mouth nightly. Max Daily Amount: 300 mg.  tretinoin (RETIN-A) 0.1 % topical cream Apply  to affected area.  naproxen sod/diphenhydramine (ALEVE PM PO) Take 2 Tabs by mouth nightly.  amLODIPine (NORVASC) 5 mg tablet TK 1 T PO QD    atorvastatin (LIPITOR) 20 mg tablet TK 1 T PO QD    lisinopril-hydroCHLOROthiazide (PRINZIDE, ZESTORETIC) 20-12.5 mg per tablet TK 1 T PO QD    aspirin delayed-release 81 mg tablet Take 81 mg by mouth daily.  escitalopram oxalate (LEXAPRO) 20 mg tablet Take 20 mg by mouth daily.  omeprazole (PRILOSEC OTC) 20 mg tablet Take 20 mg by mouth daily. No current facility-administered medications for this visit.        Lina Shields MD  [facility] [place]  [address] [facility] [address]  [address]

## 2021-10-28 ENCOUNTER — TELEPHONE (OUTPATIENT)
Dept: CARDIOLOGY CLINIC | Age: 61
End: 2021-10-28

## 2021-10-28 ENCOUNTER — ANCILLARY PROCEDURE (OUTPATIENT)
Dept: CARDIOLOGY CLINIC | Age: 61
End: 2021-10-28
Payer: COMMERCIAL

## 2021-10-28 VITALS
HEIGHT: 61 IN | DIASTOLIC BLOOD PRESSURE: 88 MMHG | SYSTOLIC BLOOD PRESSURE: 138 MMHG | WEIGHT: 171 LBS | BODY MASS INDEX: 32.28 KG/M2

## 2021-10-28 DIAGNOSIS — I20.0 UNSTABLE ANGINA (HCC): ICD-10-CM

## 2021-10-28 DIAGNOSIS — I10 BENIGN ESSENTIAL HTN: ICD-10-CM

## 2021-10-28 DIAGNOSIS — I48.0 PAROXYSMAL A-FIB (HCC): ICD-10-CM

## 2021-10-28 DIAGNOSIS — R06.02 SHORT OF BREATH ON EXERTION: ICD-10-CM

## 2021-10-28 LAB
STRESS BASELINE DIAS BP: 88 MMHG
STRESS BASELINE HR: 61 BPM
STRESS BASELINE SYS BP: 138 MMHG
STRESS O2 SAT PEAK: 98 %
STRESS O2 SAT REST: 95 %
STRESS PEAK DIAS BP: 90 MMHG
STRESS PEAK SYS BP: 160 MMHG
STRESS PERCENT HR ACHIEVED: 63 %
STRESS POST PEAK HR: 100 BPM
STRESS RATE PRESSURE PRODUCT: NORMAL BPM*MMHG
STRESS TARGET HR: 159 BPM

## 2021-10-28 PROCEDURE — A9500 TC99M SESTAMIBI: HCPCS | Performed by: INTERNAL MEDICINE

## 2021-10-28 PROCEDURE — 93015 CV STRESS TEST SUPVJ I&R: CPT | Performed by: INTERNAL MEDICINE

## 2021-10-28 PROCEDURE — 78452 HT MUSCLE IMAGE SPECT MULT: CPT | Performed by: INTERNAL MEDICINE

## 2021-10-28 RX ORDER — TETRAKIS(2-METHOXYISOBUTYLISOCYANIDE)COPPER(I) TETRAFLUOROBORATE 1 MG/ML
30 INJECTION, POWDER, LYOPHILIZED, FOR SOLUTION INTRAVENOUS ONCE
Status: COMPLETED | OUTPATIENT
Start: 2021-10-28 | End: 2021-10-28

## 2021-10-28 RX ORDER — TETRAKIS(2-METHOXYISOBUTYLISOCYANIDE)COPPER(I) TETRAFLUOROBORATE 1 MG/ML
10 INJECTION, POWDER, LYOPHILIZED, FOR SOLUTION INTRAVENOUS ONCE
Status: COMPLETED | OUTPATIENT
Start: 2021-10-28 | End: 2021-10-28

## 2021-10-28 RX ADMIN — TETRAKIS(2-METHOXYISOBUTYLISOCYANIDE)COPPER(I) TETRAFLUOROBORATE 25.4 MILLICURIE: 1 INJECTION, POWDER, LYOPHILIZED, FOR SOLUTION INTRAVENOUS at 10:35

## 2021-10-28 RX ADMIN — TETRAKIS(2-METHOXYISOBUTYLISOCYANIDE)COPPER(I) TETRAFLUOROBORATE 8.6 MILLICURIE: 1 INJECTION, POWDER, LYOPHILIZED, FOR SOLUTION INTRAVENOUS at 09:10

## 2021-10-28 NOTE — TELEPHONE ENCOUNTER
----- Message from Priyanka Porter MD sent at 10/28/2021  2:21 PM EDT -----  Please let pt know stress test was normal. thx

## 2021-10-28 NOTE — TELEPHONE ENCOUNTER
Pt stated that HR has been getting a little low. HR 40's to 50's. Pt stated that she is tired when her HR is low. Pt is currently taking toprol 50 mg and diltiazem 120 mg.  Pt was informed that message will be sent to MD.

## 2021-10-28 NOTE — TELEPHONE ENCOUNTER
MD Abi Baca, RN  Caller: Unspecified (Today,  3:15 PM)  Would hold toprol for 3-4 days (or until HR gets better). Then restart toprol at 25 mg once daily.  thx

## 2021-11-12 ENCOUNTER — OFFICE VISIT (OUTPATIENT)
Dept: CARDIOLOGY CLINIC | Age: 61
End: 2021-11-12
Payer: COMMERCIAL

## 2021-11-12 VITALS
OXYGEN SATURATION: 94 % | BODY MASS INDEX: 32.47 KG/M2 | HEIGHT: 61 IN | SYSTOLIC BLOOD PRESSURE: 138 MMHG | RESPIRATION RATE: 16 BRPM | WEIGHT: 172 LBS | DIASTOLIC BLOOD PRESSURE: 86 MMHG | HEART RATE: 81 BPM

## 2021-11-12 DIAGNOSIS — I48.0 PAROXYSMAL A-FIB (HCC): Primary | ICD-10-CM

## 2021-11-12 DIAGNOSIS — I10 BENIGN ESSENTIAL HTN: ICD-10-CM

## 2021-11-12 DIAGNOSIS — E78.2 MIXED HYPERLIPIDEMIA: ICD-10-CM

## 2021-11-12 DIAGNOSIS — Z72.0 TOBACCO USE: ICD-10-CM

## 2021-11-12 PROCEDURE — 99214 OFFICE O/P EST MOD 30 MIN: CPT | Performed by: INTERNAL MEDICINE

## 2021-11-12 RX ORDER — METOPROLOL SUCCINATE 25 MG/1
25 TABLET, EXTENDED RELEASE ORAL DAILY
Qty: 90 TABLET | Refills: 1 | Status: SHIPPED | OUTPATIENT
Start: 2021-11-12

## 2021-11-12 NOTE — LETTER
Patient: Sergey Lockhart   YOB: 1960  Date of Visit: 11/12/2021      Dear Sara Blake MD  [address]  [address] [place] [address]  Via Fax: [phone]:      Ms. Rafiq Salter is a 62 yo F with history of breast cancer, status post lumpectomy in 2019 and then radiation therapy in remission followed by Dr. Farhana Donaldson, essential hypertension, mixed hyperlipidemia, positive tobacco use working on cessation, ADHD followed by psychiatry. On her last visit due to having significant breakthrough palpitations with her atrial fibrillation, had her take Toprol 50 mg to take opposite of the Diltiazem. Shortly after taking this, her heart rate went down in the 40s and she was very fatigued and symptomatic from this, so decreased her Toprol to 25 mg. She ended up stopping her Diltiazem as well. Thus far, she just has minimal palpitations, but controlled. Due to shortness of breath, also possible anginal equivalent, also obtained a stress test that demonstrated no ischemia and we discussed the results. She denies any exertional chest pain. Her breathing has been stable. She has been compliant with her medications. She is compensated on exam with clear lungs and no lower extremity edema. Soc hx. Tobacco use, seeing acupunture  Fam hx. No early CAD  Assessment and Plan:   1. Paroxysmal atrial fibrillation. For now, we will have her on just 25 mg of Toprol. If she has palpitations in the future, potentially add back the Diltiazem. Will tentatively have her follow back in three months. 2. Essential hypertension. Blood pressure is overall controlled and so will keep her off the Diltiazem and Lisinopril. She is going to check her blood pressure for the next week. If it is elevated would potentially add back Lisinopril. As noted above when she was on both Diltiazem and Toprol, her heart rate seemed to go too low. 3. Mixed hyperlipidemia. Tolerating statin. 4. Tobacco use. Working on cessation. Her stress test was normal with no ischemia. 5. Breast cancer, in remission, status post lumpectomy and radiation therapy. If you have questions, please do not hesitate to call me.        Sincerely,      Stan Lopez MD

## 2021-11-12 NOTE — PROGRESS NOTES
[address]: [place]  [phone]    History of Present Illness:  Ms. Emile Marques is a 60 yo F with history of breast cancer, status post lumpectomy in 2019 and then radiation therapy in remission followed by Dr. Georgette Ventura, essential hypertension, mixed hyperlipidemia, positive tobacco use working on cessation, ADHD followed by psychiatry. On her last visit due to having significant breakthrough palpitations with her atrial fibrillation, had her take Toprol 50 mg to take opposite of the Diltiazem. Shortly after taking this, her heart rate went down in the 40s and she was very fatigued and symptomatic from this, so decreased her Toprol to 25 mg. She ended up stopping her Diltiazem as well. Thus far, she just has minimal palpitations, but controlled. Due to shortness of breath, also possible anginal equivalent, also obtained a stress test that demonstrated no ischemia and we discussed the results. She denies any exertional chest pain. Her breathing has been stable. She has been compliant with her medications. She is compensated on exam with clear lungs and no lower extremity edema. Soc hx. Tobacco use, seeing acupunture  Fam hx. No early CAD  Assessment and Plan:   1. Paroxysmal atrial fibrillation. For now, we will have her on just 25 mg of Toprol. If she has palpitations in the future, potentially add back the Diltiazem. Will tentatively have her follow back in three months. 2. Essential hypertension. Blood pressure is overall controlled and so will keep her off the Diltiazem and Lisinopril. She is going to check her blood pressure for the next week. If it is elevated would potentially add back Lisinopril. As noted above when she was on both Diltiazem and Toprol, her heart rate seemed to go too low. 3. Mixed hyperlipidemia. Tolerating statin. 4. Tobacco use. Working on cessation. Her stress test was normal with no ischemia.     5. Breast cancer, in remission, status post lumpectomy and radiation therapy. 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 She also has no past medical history of Fracture. All other systems negative except as above. PE  Vitals:    11/12/21 0946   BP: 138/86   Pulse: 81   Resp: 16   SpO2: 94%   Weight: 172 lb (78 kg)   Height: 5' 1\" (1.549 m)    Body mass index is 32.5 kg/m². General appearance - alert, well appearing, and in no distress  Mental status - affect appropriate to mood  Eyes - sclera anicteric, moist mucous membranes  Neck - supple, no JVD  Chest - clear to auscultation, no wheezes, rales or rhonchi  Heart - normal rate, regular rhythm, normal S1, S2, no murmurs, rubs, clicks or gallops  Abdomen - soft, nontender, nondistended, no masses or organomegaly  Neurological - no focal deficit  Extremities - peripheral pulses normal, no pedal edema    Recent Labs:  No results found for: CHOL, CHOLX, CHLST, CHOLV, 966995, HDL, HDLP, LDL, LDLC, DLDLP, TGLX, TRIGL, TRIGP, CHHD, CHHDX  Lab Results   Component Value Date/Time    Creatinine (POC) 1.0 02/01/2019 08:17 AM    Creatinine 0.95 02/18/2019 10:19 AM     Lab Results   Component Value Date/Time    BUN 26 (H) 02/18/2019 10:19 AM     Lab Results   Component Value Date/Time    Potassium 4.8 02/18/2019 10:19 AM     No results found for: HBA1C, [id number], [id number]  No results found for: HGBPOC, HGB, HGBP, HGBEXT, HGBEXT  No results found for: PLT, PLTEXT, PLTEXT    Reviewed:  Past Medical History:   Diagnosis Date    Autoimmune disease ([facility] 2016    Mixed Connective Tissue disease / Chronic Fatigued    Breast cancer ([facility] 02/26/2019    Right lumpectomy with radiation.     Cancer ([place] [address].) 01/2019    RIGHT breast     Depression     GERD (gastroesophageal reflux disease)     High cholesterol     Hypertension     Menopause 07/2019    [facility] connective tissue disease ([facility] 2017    Sees Dr. Eliu Anders S/P radiation therapy 2019    Right lumpectomy with radiation.  Traumatic neuroma, left neck 09/14/2010     Social History     Tobacco Use   Smoking Status Current Every Day Smoker    Packs/day: 1.00    Years: 20.00    Pack years: 20.00   Smokeless Tobacco Never Used     Social History     Substance and Sexual Activity   Alcohol Use Yes    Alcohol/week: 2.0 standard drinks    Types: 2 Shots of liquor per week    Comment: rarely   2 drinks 2x/year     No Known Allergies    Current Outpatient Medications   Medication Sig    metoprolol succinate (TOPROL-XL) 50 mg XL tablet Take 1 Tablet by mouth daily.  apixaban (ELIQUIS) 5 mg tablet Take 1 Tablet by mouth two (2) times a day.  anastrozole (ARIMIDEX) 1 mg tablet TAKE 1 TABLET BY MOUTH EVERY DAY    atorvastatin (LIPITOR) 20 mg tablet TK 1 T PO QD    escitalopram oxalate (LEXAPRO) 20 mg tablet Take 20 mg by mouth daily.  omeprazole (PRILOSEC OTC) 20 mg tablet Take 20 mg by mouth daily.  varenicline (CHANTIX STARTER DAPHNIE) 0.5 mg (11)- 1 mg (42) DsPk Starter pack instructions (Patient not taking: Reported on 11/12/2021)    dilTIAZem ER (CARDIZEM CD) 120 mg capsule Take 1 Capsule by mouth daily. (Patient not taking: Reported on 11/12/2021)    amphetamine-dextroamphetamine XR (ADDERALL XR) 20 mg XR capsule  (Patient not taking: Reported on 11/12/2021)    gabapentin (NEURONTIN) 300 mg capsule Take 1 Cap by mouth nightly. Max Daily Amount: 300 mg. (Patient not taking: Reported on 11/12/2021)    tretinoin (RETIN-A) 0.1 % topical cream Apply  to affected area. (Patient not taking: Reported on 11/12/2021)    naproxen sod/diphenhydramine (ALEVE PM PO) Take 2 Tabs by mouth nightly.  (Patient not taking: Reported on 11/12/2021)    aspirin delayed-release 81 mg tablet Take 81 mg by mouth daily. (Patient not taking: Reported on 11/12/2021)     No current facility-administered medications for this visit.        Christa Hamm MD  3 [facility] heart and Vascular [place]  [facility] 84, [address] [facility] 83,[address]  [address]

## 2022-02-18 ENCOUNTER — OFFICE VISIT (OUTPATIENT)
Dept: CARDIOLOGY CLINIC | Age: 62
End: 2022-02-18
Payer: COMMERCIAL

## 2022-02-18 VITALS
HEIGHT: 61 IN | BODY MASS INDEX: 32.7 KG/M2 | RESPIRATION RATE: 14 BRPM | SYSTOLIC BLOOD PRESSURE: 134 MMHG | DIASTOLIC BLOOD PRESSURE: 84 MMHG | OXYGEN SATURATION: 98 % | HEART RATE: 66 BPM | WEIGHT: 173.2 LBS

## 2022-02-18 DIAGNOSIS — E78.2 MIXED HYPERLIPIDEMIA: ICD-10-CM

## 2022-02-18 DIAGNOSIS — Z72.0 TOBACCO USE: ICD-10-CM

## 2022-02-18 DIAGNOSIS — I48.0 PAROXYSMAL A-FIB (HCC): Primary | ICD-10-CM

## 2022-02-18 DIAGNOSIS — I10 BENIGN ESSENTIAL HTN: ICD-10-CM

## 2022-02-18 PROCEDURE — 99214 OFFICE O/P EST MOD 30 MIN: CPT | Performed by: INTERNAL MEDICINE

## 2022-02-18 NOTE — LETTER
Patient: Meagan Lopez   YOB: 1960  Date of Visit: 2/18/2022      Dear Sonia King MD  [address]  [address] [place] [id number]  Via Fax: [phone]:       Ms. Derik Araya is a 63 yo F with history of breast cancer, status post lumpectomy in 2019 and then radiation therapy in remission followed by Dr. Cira Paez, essential hypertension, mixed hyperlipidemia, positive tobacco use working on cessation, ADHD followed by psychiatry. Since her last visit, overall she has been doing well. She denies any exertional chest pain, shortness of breath. No significant palpitations. She has been compliant with her medications. She is working on stopping smoking and has a quit date of 02/22/2022 and commended her on this. No bleeding issues on Eliquis, however, now it is very expensive $350.00 for last month. I did recommend she check with her insurance if there was a preferred alternative, otherwise, would recommend her being on Coumadin. She is compensated on exam with clear lungs and just trace lower extremity edema. Soc hx. Tobacco use, seeing acupunture  Fam hx. No early CAD  Assessment and Plan:   1. Paroxysmal atrial fibrillation. Stable on Toprol. If she has palpitations in the future, consider cutting back Diltiazem. Will have her follow back in six months. 2. Essential hypertension. Blood pressure is controlled. 3. Chronic anticoagulation. If Eliquis is going to be cost prohibitive, would recommend if her insurance has a preferred alternative with Pradaxa or Xarelto. Otherwise, would recommend her being on Coumadin. 4. Tobacco use. Working on cessation. She has a quit date of 02/22/2022.   5. Mixed hyperlipidemia. Tolerating statin. 6. Breast cancer. In remission, status post lumpectomy and radiation therapy. If you have questions, please do not hesitate to call me.     Sincerely,      Bea Teran MD

## 2022-02-18 NOTE — PROGRESS NOTES
[address]: Justyna Gan  [id number]    History of Present Illness:  Ms. Zahra Gonzales is a 65 yo F with history of breast cancer, status post lumpectomy in 2019 and then radiation therapy in remission followed by Dr. Paul Rolle, essential hypertension, mixed hyperlipidemia, positive tobacco use working on cessation, ADHD followed by psychiatry. Since her last visit, overall she has been doing well. She denies any exertional chest pain, shortness of breath. No significant palpitations. She has been compliant with her medications. She is working on stopping smoking and has a quit date of 02/22/2022 and commended her on this. No bleeding issues on Eliquis, however, now it is very expensive $350.00 for last month. I did recommend she check with her insurance if there was a preferred alternative, otherwise, would recommend her being on Coumadin. She is compensated on exam with clear lungs and just trace lower extremity edema. Soc hx. Tobacco use, seeing acupunture  Fam hx. No early CAD  Assessment and Plan:   1. Paroxysmal atrial fibrillation. Stable on Toprol. If she has palpitations in the future, consider cutting back Diltiazem. Will have her follow back in six months. 2. Essential hypertension. Blood pressure is controlled. 3. Chronic anticoagulation. If Eliquis is going to be cost prohibitive, would recommend if her insurance has a preferred alternative with Pradaxa or Xarelto. Otherwise, would recommend her being on Coumadin. 4. Tobacco use. Working on cessation. She has a quit date of 02/22/2022.   5. Mixed hyperlipidemia. Tolerating statin. 6. Breast cancer. In remission, status post lumpectomy and radiation therapy.         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 She has no past medical history of Fracture. All other systems negative except as above. PE  Vitals:    02/18/22 0927   BP: 134/84   Pulse: 66   Resp: 14   SpO2: 98%   Weight: 173 lb 3.2 oz (78.6 kg)   Height: 5' 1\" (1.549 m)    Body mass index is 32.73 kg/m². General appearance - alert, well appearing, and in no distress  Mental status - affect appropriate to mood  Eyes - sclera anicteric, moist mucous membranes  Neck - supple, no JVD  Chest - clear to auscultation, no wheezes, rales or rhonchi  Heart - normal rate, regular rhythm, normal S1, S2, no murmurs, rubs, clicks or gallops  Abdomen - soft, nontender, nondistended, no masses or organomegaly  Neurological - no focal deficit  Extremities - peripheral pulses normal, no pedal edema    Recent Labs:  No results found for: CHOL, CHOLX, CHLST, CHOLV, [id number], HDL, HDLP, LDL, LDLC, DLDLP, TGLX, TRIGL, TRIGP, CHHD, CHHDX  Lab Results   Component Value Date/Time    Creatinine (POC) 1.0 02/01/2019 08:17 AM    Creatinine 0.95 02/18/2019 10:19 AM     Lab Results   Component Value Date/Time    BUN 26 (H) 02/18/2019 10:19 AM     Lab Results   Component Value Date/Time    Potassium 4.8 02/18/2019 10:19 AM     No results found for: HBA1C, [id number], [id number]  No results found for: HGBPOC, HGB, HGBP, HGBEXT, HGBEXT  No results found for: PLT, PLTEXT, PLTEXT    Reviewed:  Past Medical History:   Diagnosis Date    Autoimmune disease ([facility] 2016    Mixed Connective Tissue disease / Chronic Fatigued    Breast cancer ([facility] 02/26/2019    Right lumpectomy with radiation.  Cancer ([facility] 01/2019    RIGHT breast     Depression     GERD (gastroesophageal reflux disease)     High cholesterol     Hypertension     Menopause 07/2019    Mixed connective tissue disease ([facility] 2017    Sees Dr. Catrachito London S/P radiation therapy 2019    Right lumpectomy with radiation.     Traumatic neuroma, left neck 09/14/2010     Social History     Tobacco Use   Smoking Status Current Every Day Smoker    Packs/day: 1.00    Years: 20.00    Pack years: 20.00   Smokeless Tobacco Never Used     Social History     Substance and Sexual Activity   Alcohol Use Yes    Alcohol/week: 2.0 standard drinks    Types: 2 Shots of liquor per week    Comment: rarely   2 drinks 2x/year     No Known Allergies    Current Outpatient Medications   Medication Sig    metoprolol succinate (TOPROL-XL) 25 mg XL tablet Take 1 Tablet by mouth daily.  apixaban (ELIQUIS) 5 mg tablet Take 1 Tablet by mouth two (2) times a day.  anastrozole (ARIMIDEX) 1 mg tablet TAKE 1 TABLET BY MOUTH EVERY DAY    atorvastatin (LIPITOR) 20 mg tablet TK 1 T PO QD    escitalopram oxalate (LEXAPRO) 20 mg tablet Take 20 mg by mouth daily.  omeprazole (PRILOSEC OTC) 20 mg tablet Take 20 mg by mouth daily. No current facility-administered medications for this visit.        Pushpa Iqbal MD  [facility] heart and Vascular [place]  [address] [facility] 83,[address]  [address]

## 2022-03-19 PROBLEM — C50.911 INVASIVE DUCTAL CARCINOMA OF BREAST, FEMALE, RIGHT (HCC): Status: ACTIVE | Noted: 2019-01-18

## 2022-07-15 ENCOUNTER — PATIENT MESSAGE (OUTPATIENT)
Dept: CARDIOLOGY CLINIC | Age: 62
End: 2022-07-15

## 2022-07-15 DIAGNOSIS — I48.0 PAROXYSMAL A-FIB (HCC): Primary | ICD-10-CM

## 2022-07-15 NOTE — TELEPHONE ENCOUNTER
Refilled per VO per MD  Future Appointments   Date Time Provider Kash Miroslava   8/12/2022  8:40 AM MD KISHA Daly BS AMB   9/21/2022  8:15 AM Yordan Velazquez MD [facility] BS AMB

## 2022-09-12 ENCOUNTER — OFFICE VISIT (OUTPATIENT)
Dept: CARDIOLOGY CLINIC | Age: 62
End: 2022-09-12
Payer: COMMERCIAL

## 2022-09-12 VITALS
DIASTOLIC BLOOD PRESSURE: 78 MMHG | SYSTOLIC BLOOD PRESSURE: 118 MMHG | HEART RATE: 54 BPM | OXYGEN SATURATION: 94 % | RESPIRATION RATE: 18 BRPM | BODY MASS INDEX: 31.83 KG/M2 | WEIGHT: 168.6 LBS | HEIGHT: 61 IN

## 2022-09-12 DIAGNOSIS — I48.0 PAROXYSMAL A-FIB (HCC): Primary | ICD-10-CM

## 2022-09-12 DIAGNOSIS — Z72.0 TOBACCO USE: ICD-10-CM

## 2022-09-12 DIAGNOSIS — E78.2 MIXED HYPERLIPIDEMIA: ICD-10-CM

## 2022-09-12 DIAGNOSIS — I10 BENIGN ESSENTIAL HTN: ICD-10-CM

## 2022-09-12 DIAGNOSIS — Z79.01 CHRONIC ANTICOAGULATION: ICD-10-CM

## 2022-09-12 PROCEDURE — 99214 OFFICE O/P EST MOD 30 MIN: CPT | Performed by: INTERNAL MEDICINE

## 2022-09-12 RX ORDER — BUPROPION HYDROCHLORIDE 150 MG/1
TABLET, EXTENDED RELEASE ORAL
COMMUNITY
Start: 2022-09-04

## 2022-09-12 NOTE — PROGRESS NOTES
[address]: Renee Gitelman  [id number]    History of Present Illness:  Ms. Beau Flores is a 63 yo F with PAF on BB for rate control and eliquis for [address], history of breast cancer, status post lumpectomy in 2019 and then radiation therapy in remission followed by Dr. Can Segura, essential hypertension, mixed hyperlipidemia, positive tobacco use working on cessation, ADHD followed by psychiatry. Since her last visit, she has been doing well. She is retiring in 22 days and looking forward to that. She is having her second grandchild soon, so she thinks she will be busy helping there. She denies any significant palpitations. No chest pains or shortness of breath. She has been compliant with her medications. For now, Eliquis is not too expensive. She continues to work on tobacco cessation. She is compensated on exam with clear lungs and just trace lower extremity edema. Soc hx. Tobacco use, seeing acupunture  Fam hx. No early CAD  Assessment and Plan:   1. Paroxysmal atrial fibrillation. Stable. If she has palpitations in the future, could always consider Diltiazem. Will have her follow back in six months. 2. Chronic anticoagulation. No bleeding issues on Eliquis. 3. Essential hypertension. Blood pressure is controlled. 4. Tobacco use. Working on cessation. 5. Mixed hyperlipidemia. Tolerating statin. 6. Breast cancer. In remission, status post lumpectomy and radiation therapy. She  has a past medical history of Autoimmune disease ([address] (2016), Breast cancer ([address] (02/26/2019), Cancer ([address] (01/2019), Depression, GERD (gastroesophageal reflux disease), High cholesterol, Hypertension, Menopause (07/2019), Mixed connective tissue disease ([address] (2017), S/P radiation therapy (2019), and Traumatic neuroma, left neck (09/14/2010). She has no past medical history of Fracture. All other systems negative except as above.      PE  Vitals:    09/12/22 0819   BP: 118/78   Pulse: (!) 54   Resp: 18   SpO2: 94%   Weight: 168 lb 9.6 oz (76.5 kg)   Height: 5' 1\" (1.549 m)      Body mass index is 31.86 kg/m². General appearance - alert, well appearing, and in no distress  Mental status - affect appropriate to mood  Eyes - sclera anicteric, moist mucous membranes  Neck - supple, no JVD  Chest - clear to auscultation, no wheezes, rales or rhonchi  Heart - normal rate, regular rhythm, normal S1, S2, no murmurs, rubs, clicks or gallops  Abdomen - soft, nontender, nondistended, no masses or organomegaly  Neurological - no focal deficit  Extremities - peripheral pulses normal, no pedal edema    Recent Labs:  No results found for: CHOL, CHOLX, CHLST, CHOLV, 580242, HDL, HDLP, LDL, LDLC, DLDLP, TGLX, TRIGL, TRIGP, CHHD, CHHDX  Lab Results   Component Value Date/Time    Creatinine (POC) 1.0 02/01/2019 08:17 AM    Creatinine 0.95 02/18/2019 10:19 AM     Lab Results   Component Value Date/Time    BUN 26 (H) 02/18/2019 10:19 AM     Lab Results   Component Value Date/Time    Potassium 4.8 02/18/2019 10:19 AM     No results found for: HBA1C, [id number], [id number]  No results found for: HGBPOC, HGB, HGBP, HGBEXT, HGBEXT  No results found for: PLT, PLTEXT, PLTEXT    Reviewed:  Past Medical History:   Diagnosis Date    [facility] ([facility] 2016    Mixed Connective Tissue disease / Chronic Fatigued    Breast cancer ([facility] 02/26/2019    Right lumpectomy with radiation. Cancer ([facility] 01/2019    RIGHT breast     Depression     GERD (gastroesophageal reflux disease)     High cholesterol     Hypertension     Menopause 07/2019    Mixed connective tissue disease ([facility] 2017    Sees Dr. Jaclynn Gilford    S/P radiation therapy 2019    Right lumpectomy with radiation.     Traumatic neuroma, left neck 09/14/2010     Social History     Tobacco Use   Smoking Status Every Day    Packs/day: 1.00    Years: 20.00    Pack years: 20.00    Types: Cigarettes   Smokeless Tobacco Never     Social History     Substance and Sexual Activity Alcohol Use Yes    Alcohol/week: 2.0 standard drinks    Types: 2 Shots of liquor per week    Comment: rarely   2 drinks 2x/year     No Known Allergies    Current Outpatient Medications   Medication Sig    buPROPion ER (ZYBAN,BUPROBAN) 150 mg ER tablet     apixaban (ELIQUIS) 5 mg tablet Take 1 Tablet by mouth two (2) times a day. metoprolol succinate (TOPROL-XL) 25 mg XL tablet Take 1 Tablet by mouth daily. anastrozole (ARIMIDEX) 1 mg tablet TAKE 1 TABLET BY MOUTH EVERY DAY    atorvastatin (LIPITOR) 20 mg tablet TK 1 T PO QD    escitalopram oxalate (LEXAPRO) 20 mg tablet Take 20 mg by mouth daily. omeprazole (PRILOSEC OTC) 20 mg tablet Take 20 mg by mouth daily. No current facility-administered medications for this visit.        Kelley Beckman MD  [address] [facility] heart and Vascular [place]  [address] [facility] 83,[address]  [facility], [address]

## 2022-09-12 NOTE — LETTER
Patient: Harmony Mcfadden   YOB: 1960  Date of Visit: 9/12/2022      Dear Maurizio Clark MD  [address]  [address] [facility] [address]  Via Fax: [phone]:      Ms. Tosha Flores is a 65 yo F with PAF on BB for rate control and eliquis for [address], history of breast cancer, status post lumpectomy in 2019 and then radiation therapy in remission followed by Dr. Kaela Serrato, essential hypertension, mixed hyperlipidemia, positive tobacco use working on cessation, ADHD followed by psychiatry. Since her last visit, she has been doing well. She is retiring in 22 days and looking forward to that. She is having her second grandchild soon, so she thinks she will be busy helping there. She denies any significant palpitations. No chest pains or shortness of breath. She has been compliant with her medications. For now, Eliquis is not too expensive. She continues to work on tobacco cessation. She is compensated on exam with clear lungs and just trace lower extremity edema. Soc hx. Tobacco use, seeing acupunture  Fam hx. No early CAD  Assessment and Plan:   1. Paroxysmal atrial fibrillation. Stable. If she has palpitations in the future, could always consider Diltiazem. Will have her follow back in six months. 2. Chronic anticoagulation. No bleeding issues on Eliquis. 3. Essential hypertension. Blood pressure is controlled. 4. Tobacco use. Working on cessation. 5. Mixed hyperlipidemia. Tolerating statin. 6. Breast cancer. In remission, status post lumpectomy and radiation therapy. If you have questions, please do not hesitate to call me.     Sincerely,      Amandeep Baez MD

## 2022-09-21 ENCOUNTER — OFFICE VISIT (OUTPATIENT)
Dept: ONCOLOGY | Age: 62
End: 2022-09-21
Payer: COMMERCIAL

## 2022-09-21 VITALS
BODY MASS INDEX: 31.87 KG/M2 | DIASTOLIC BLOOD PRESSURE: 68 MMHG | WEIGHT: 168.8 LBS | OXYGEN SATURATION: 93 % | HEIGHT: 61 IN | HEART RATE: 51 BPM | TEMPERATURE: 98 F | RESPIRATION RATE: 18 BRPM | SYSTOLIC BLOOD PRESSURE: 123 MMHG

## 2022-09-21 DIAGNOSIS — C50.911 INVASIVE DUCTAL CARCINOMA OF BREAST, FEMALE, RIGHT (HCC): Primary | ICD-10-CM

## 2022-09-21 DIAGNOSIS — I48.91 ATRIAL FIBRILLATION, UNSPECIFIED TYPE (HCC): ICD-10-CM

## 2022-09-21 DIAGNOSIS — Z78.0 POSTMENOPAUSAL: ICD-10-CM

## 2022-09-21 PROCEDURE — 99214 OFFICE O/P EST MOD 30 MIN: CPT | Performed by: INTERNAL MEDICINE

## 2022-09-21 RX ORDER — ANASTROZOLE 1 MG/1
TABLET ORAL
Qty: 90 TABLET | Refills: 3 | Status: SHIPPED | OUTPATIENT
Start: 2022-09-21

## 2022-09-21 NOTE — PROGRESS NOTES
Cancer [place] at [facility] [address]  [address] [facility], [address] [facility] [address] [facility]  Frankey Reil: [phone]  F: [phone]    Reason for Visit:   Anusha Mckeon is a 64 y.o. female who is seen for follow up of breast cancer    Breast surgeon:  Dr. Latia Reagan physician:  Dr. 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 Interval history: In today for follow up. Reports gr 1 fatigue, gr 1 hot flashes,     FH:  No breast or ovarian cancer or prostate cancer or pancreas cancer; does have FH of GBMs    LMP 7/2018    Past Medical History:   Diagnosis Date    Autoimmune disease ([address] 2016    Mixed Connective Tissue disease / Chronic Fatigued    Breast cancer ([address] 02/26/2019    Right lumpectomy with radiation. Cancer ([address] 01/2019    RIGHT breast     Depression     GERD (gastroesophageal reflux disease)     High cholesterol     Hypertension     Menopause 07/2019    Mixed connective tissue disease ([address] 2017    Sees Dr. Jorge Maher    S/P radiation therapy 2019    Right lumpectomy with radiation.     Traumatic neuroma, left neck 09/14/2010      Past Surgical History:   Procedure Laterality Date    HX BREAST LUMPECTOMY Right 2/26/2019    RIGHT BREAST LUMPECTOMY WITH ULTRASOUND AND RIGHT BREAST SENTINEL NODE BIOPSY performed by Elvin Fuchs MD at [address]    HX CHOLECYSTECTOMY      HX HERNIA REPAIR      Umbilical Hernia Repair    HX TUBAL LIGATION      JEANIE STEREO VAC  BX BREAST LT 1ST LESION W/CLIP AND SPECIMEN Left Unknown Benign x 2    JEANIE STEREO VAC  BX BREAST RT 1ST LESION W/CLIP AND SPECIMEN Right 01/18/2019    Positive for breast cancer    NM PREP FACE/ORAL PROST PALATAL LIFT      NM REMOVAL OF ANAL FISSURE        Social History     Tobacco Use    Smoking status: Every Day     Packs/day: 1.00     Years: 20.00     Pack years: 20.00     Types: Cigarettes    Smokeless tobacco: Never   Substance Use Topics    Alcohol use: Yes     Alcohol/week: 2.0 standard drinks     Types: 2 Shots of liquor per week     Comment: rarely   2 drinks 2x/year      Family History   Problem Relation Age of Onset    Cancer Mother         Colon    Cancer Father         Lung    Cancer Brother         Brain    Cancer Paternal Aunt         Brain     Current Outpatient Medications   Medication Sig    buPROPion ER (ZYBAN,BUPROBAN) 150 mg ER tablet     apixaban (ELIQUIS) 5 mg tablet Take 1 Tablet by mouth two (2) times a day. metoprolol succinate (TOPROL-XL) 25 mg XL tablet Take 1 Tablet by mouth daily. anastrozole (ARIMIDEX) 1 mg tablet TAKE 1 TABLET BY MOUTH EVERY DAY    atorvastatin (LIPITOR) 20 mg tablet TK 1 T PO QD    escitalopram oxalate (LEXAPRO) 20 mg tablet Take 20 mg by mouth daily. omeprazole (PRILOSEC OTC) 20 mg tablet Take 20 mg by mouth daily. No current facility-administered medications for this visit. No Known Allergies     Review of Systems: A complete review of systems was obtained, negative except as described above.     Physical Exam:     Visit Vitals  /68   Pulse (!) 51   Temp 98 °F (36.7 °C) (Temporal)   Resp 18   Ht 5' 1\" (1.549 m)   Wt 168 lb 12.8 oz (76.6 kg)   LMP 07/01/2019 (Approximate)   SpO2 93%   BMI 31.89 kg/m²     ECOG PS: 0  General: No distress  Respiratory: Normal respiratory effort  CV: No peripheral edema, cardiac monitor in place   Skin: No rashes, ecchymoses, or petechiae  Psych: Alert, oriented, normal mood/affect      Results:   No results found for: WBC, HGB, HCT, PLT, MCV, ANEU, HGBPOC, HCTPOC, HGBEXT, HCTEXT, PLTEXT, HGBEXT, HCTEXT, PLTEXT  Lab Results   Component Value Date/Time    Sodium 139 02/18/2019 10:19 AM    Potassium 4.8 02/18/2019 10:19 AM    Chloride 102 02/18/2019 10:19 AM    CO2 31 02/18/2019 10:19 AM    Glucose 89 02/18/2019 10:19 AM    BUN 26 (H) 02/18/2019 10:19 AM    Creatinine 0.95 02/18/2019 10:19 AM    GFR est AA >60 02/18/2019 10:19 AM    GFR est non-AA >60 02/18/2019 10:19 AM    Calcium 9.5 02/18/2019 10:19 AM    Creatinine (POC) 1.0 02/01/2019 08:17 AM     No results found for: TBILI, ALT, AP, TP, ALB, GLOB  6/26/20 bilat 3D mammogram  Negative    6/26/20 dexa      Findings:        Femoral Neck Left:  Bone mineral density (gm/cm2): 0.879  % of peak bone mass: 85  % for age matched controls: 95  T-score: -1.1  Z-score: -0.3     Femoral Neck Right:  Bone mineral density (gm/cm2): 0.894  % of peak bone mass: 86  % for age matched controls:  80  T-score: -1.0  Z-score: -0.2     Total Hip Left:  Bone mineral density (gm/cm2): 1.047  % of peak bone mass: 104  % for age matched controls: 110  T-score: 0.3  Z-score: 0.8     Total Hip Right:  Bone mineral density (gm/cm2): 1.052  % of peak bone mass: 104  % for age matched controls:  111  T-score: 0.3  Z-score: 0.8     Lumbar Spine: L1-L4  Bone mineral density (gm/cm2): 1.167  % of peak bone mass: 98  % for age matched controls: 103  T-score: -0.2  Z-score: 0.3        IMPRESSION  Impression: This patient is osteopenic using the World Health Organization criteria  10 year probability of major osteoporotic fracture: 6.6%  10 year probability of hip fracture: 0.7%    6/28/2021  IMPRESSION  1. BIRADS ASSESSMENT CATEGORY 2, benign. 2.  Followup diagnostic mammography is recommended for a total of 5 years  postoperatively, unless earlier suggested clinically. 3.  These findings have been relayed to the patient. Records reviewed and summarized above. Pathology report(s) reviewed above.   Radiology report(s) reviewed above.      Assessment/plan:   1. 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 postmenopausal    We explained to the patient that the goal of systemic adjuvant therapy is to improve the chances for cure and decrease the risk of relapse. We explained why a patient can have microscopic cancer spread now even though physical examination, laboratory studies and imaging studies are negative for cancer. We explained that the same treatments used now as adjuvant or preventive treatments rarely if ever are curative in women who develop metastases. Low clinical and mammaprint risk, no indication for chemotherapy. Continue anastrozole 1 mg daily. Completed XRT 5/7/19 and started anastrozole on 5/27/19, tolerating well. Mammogram 6/2021 benign, ordered one for now    2. Emotional well being:  She has excellent support and is coping well with her disease; on lexapro 20 mg daily. 3. Hot flashes:  Mild, on lexapro; Previously taking gabapentin nightly but stopped due to fatigue. 4. Osteopenia:  DEXA 6/2020, next due 6/2022 - was not done, so ordered today. Recommend 2000 int units of vit D3 daily and 2-3 servings dietary calcium. Not taking vit D3 consistently    5. Lung cancer screening:  sees Dr. Juli Lipscomb in nov and will get this done then    6. Afib:  on eliquis    RTC in 1 year or sooner if needed. I appreciate the opportunity to participate in Ms. Adiel Arce's care. Signed By: Matthieu Lawler MD      No orders of the defined types were placed in this encounter.

## 2022-09-21 NOTE — PROGRESS NOTES
Naomi Dennison is a 64 y.o. female Follow up for the evaluation of breast cancer. 1. Have you been to the ER, urgent care clinic since your last visit? Hospitalized since your last visit? No    2. Have you seen or consulted any other health care providers outside of the [address] [place] [address] since your last visit? Include any pap smears or colon screening.  No

## 2022-11-03 ENCOUNTER — HOSPITAL ENCOUNTER (OUTPATIENT)
Dept: MAMMOGRAPHY | Age: 62
Discharge: HOME | End: 2022-11-03
Attending: INTERNAL MEDICINE
Payer: COMMERCIAL

## 2022-11-03 DIAGNOSIS — C50.911 INVASIVE DUCTAL CARCINOMA OF BREAST, FEMALE, RIGHT (HCC): ICD-10-CM

## 2022-11-03 DIAGNOSIS — Z78.0 POSTMENOPAUSAL: ICD-10-CM

## 2022-11-03 PROCEDURE — 77080 DXA BONE DENSITY AXIAL: CPT

## 2022-11-03 PROCEDURE — 77062 BREAST TOMOSYNTHESIS BI: CPT

## 2022-11-14 NOTE — PROGRESS NOTES
Called to patient. Discussed bone density slightly worsened and remains osteopenic. She has not been taking her vitamin D consistently. Per Dr. Mary Gross he is OK if she takes vitamin D 3 2000 international units daily OR she could start bisphosphonate therapy. Briefly discussed zometa and fosamax. Patient states she would like to take the vitamin D daily. She had no further questions or concerns.

## 2023-01-28 DIAGNOSIS — I48.0 PAROXYSMAL A-FIB (HCC): ICD-10-CM

## 2023-01-30 RX ORDER — APIXABAN 5 MG/1
TABLET, FILM COATED ORAL
Qty: 180 TABLET | Refills: 1 | Status: SHIPPED | OUTPATIENT
Start: 2023-01-30

## 2023-01-30 NOTE — TELEPHONE ENCOUNTER
Requested Prescriptions     Signed Prescriptions Disp Refills    apixaban (Eliquis) 5 mg tablet 180 Tablet 1     Sig: TAKE 1 TABLET BY MOUTH TWICE DAILY     Authorizing Provider: Rosa Franco     Ordering User: Olga Lidia Todd     Verbal order per Dr. Thelma Rios.    Future Appointments   Date Time Provider [place]   3/14/2023  8:40 AM MD KISHA Benton BS AMB   9/20/2023  8:15 AM Reza Carrasquillo MD [facility] BS AMB

## 2023-05-01 RX ORDER — METOPROLOL SUCCINATE 50 MG/1
50 TABLET, EXTENDED RELEASE ORAL DAILY
Qty: 90 TABLET | Refills: 1 | OUTPATIENT
Start: 2023-05-01

## 2023-05-03 ENCOUNTER — OFFICE VISIT (OUTPATIENT)
Dept: CARDIOLOGY CLINIC | Age: 63
End: 2023-05-03

## 2023-05-03 VITALS
SYSTOLIC BLOOD PRESSURE: 130 MMHG | RESPIRATION RATE: 16 BRPM | BODY MASS INDEX: 31.34 KG/M2 | WEIGHT: 166 LBS | OXYGEN SATURATION: 97 % | DIASTOLIC BLOOD PRESSURE: 68 MMHG | HEIGHT: 61 IN | HEART RATE: 77 BPM

## 2023-05-03 DIAGNOSIS — Z79.01 CHRONIC ANTICOAGULATION: ICD-10-CM

## 2023-05-03 DIAGNOSIS — Z72.0 TOBACCO USE: ICD-10-CM

## 2023-05-03 DIAGNOSIS — I48.0 PAROXYSMAL A-FIB (HCC): Primary | ICD-10-CM

## 2023-05-03 DIAGNOSIS — I10 BENIGN ESSENTIAL HTN: ICD-10-CM

## 2023-05-03 RX ORDER — AMLODIPINE BESYLATE 5 MG/1
5 TABLET ORAL DAILY
COMMUNITY
Start: 2023-02-16 | End: 2023-05-03 | Stop reason: SDUPTHER

## 2023-05-03 RX ORDER — AMLODIPINE BESYLATE 5 MG/1
5 TABLET ORAL DAILY
Qty: 90 TABLET | Refills: 1 | Status: SHIPPED | OUTPATIENT
Start: 2023-05-03

## 2023-05-03 RX ORDER — METOPROLOL SUCCINATE 25 MG/1
25 TABLET, EXTENDED RELEASE ORAL DAILY
Qty: 90 TABLET | Refills: 1 | Status: SHIPPED | OUTPATIENT
Start: 2023-05-03

## (undated) DEVICE — NEEDLE HYPO 22GA L1.5IN BLK S STL HUB POLYPR SHLD REG BVL

## (undated) DEVICE — COVER US PRB W15XL120CM W/ GEL RUBBERBAND TAPE STRP FLD GEN

## (undated) DEVICE — SUTURE MCRYL SZ 4-0 L27IN ABSRB UD L19MM PS-2 1/2 CIR PRIM Y426H

## (undated) DEVICE — STERILE POLYISOPRENE POWDER-FREE SURGICAL GLOVES: Brand: PROTEXIS

## (undated) DEVICE — CHEST PACK: Brand: MEDLINE INDUSTRIES, INC.

## (undated) DEVICE — KENDALL SCD EXPRESS SLEEVES, KNEE LENGTH, MEDIUM: Brand: KENDALL SCD

## (undated) DEVICE — DEVICE TRNSF SPIK STL 2008S] MICROTEK MEDICAL INC]

## (undated) DEVICE — SUT SLK 2-0SH 30IN BLK --

## (undated) DEVICE — APPLICATOR BNDG 1MM ADH PREMIERPRO EXOFIN

## (undated) DEVICE — SUTURE PDS II SZ 2-0 L27IN ABSRB VLT SH L26MM 1/2 CIR Z317H

## (undated) DEVICE — SUTURE VCRL SZ 3-0 L27IN ABSRB UD L26MM SH 1/2 CIR J416H

## (undated) DEVICE — DEVON™ KNEE AND BODY STRAP 60" X 3" (1.5 M X 7.6 CM): Brand: DEVON

## (undated) DEVICE — INSULATED BLADE ELECTRODE: Brand: EDGE

## (undated) DEVICE — INFECTION CONTROL KIT SYS

## (undated) DEVICE — CURVED, SMALL JAW, OPEN SEALER/DIVIDER: Brand: LIGASURE

## (undated) DEVICE — SOL IRRIGATION INJ NACL 0.9% 500ML BTL

## (undated) DEVICE — SMOKE EVACUATION PENCIL: Brand: VALLEYLAB

## (undated) DEVICE — COVER US PRB W12XL244CM FLD IORT STR TIP

## (undated) DEVICE — LIGHT HANDLE: Brand: DEVON